# Patient Record
Sex: MALE | Race: BLACK OR AFRICAN AMERICAN | HISPANIC OR LATINO
[De-identification: names, ages, dates, MRNs, and addresses within clinical notes are randomized per-mention and may not be internally consistent; named-entity substitution may affect disease eponyms.]

---

## 2022-05-16 ENCOUNTER — APPOINTMENT (OUTPATIENT)
Dept: GASTROENTEROLOGY | Facility: CLINIC | Age: 67
End: 2022-05-16
Payer: MEDICARE

## 2022-05-16 VITALS
HEIGHT: 71 IN | HEART RATE: 82 BPM | DIASTOLIC BLOOD PRESSURE: 64 MMHG | SYSTOLIC BLOOD PRESSURE: 128 MMHG | OXYGEN SATURATION: 97 %

## 2022-05-16 DIAGNOSIS — Z12.11 ENCOUNTER FOR SCREENING FOR MALIGNANT NEOPLASM OF COLON: ICD-10-CM

## 2022-05-16 PROCEDURE — 99204 OFFICE O/P NEW MOD 45 MIN: CPT

## 2022-05-16 NOTE — ASSESSMENT
[FreeTextEntry1] : 66M, cardiomyopathy w/ reduced EF (35% in 2014), s/p ICD, s/p PCI x 2 on asa only (2006), HTN, HLD, referred for colon cancer screening by PCP Dr Solis Melendrez. Describes grade II hemorrhoids that prolapse and spontaneously reduce. No straining or sense of incomplete evacuation. Denies rectal pain, abdominal pain, hematochezia, melena, change in bowel movements, weight loss, n/v. Last colonoscopy ~10 yrs ago and normal per patient (no report available). No fhx of colon cancer. \par \par - Describes Grade II Hemorrhoids, +int/ext hemorrhoids on rectal exam in office. Recommend continuing topicals, prn miralax as needed, sitz baths \par - Refer to colorectal surgery as per patient request \par - Avg risk for colon cancer screening. Given extensive cardiac hx (PCI x 2, HFrEF, AICD), recommend obtaining cardiac clearance prior to procedure \par -  Follows w cardiologist Dr Don at [facility] [place], patient will make an appt and fax over clearance after his visit \par - RTC after cards evaluation \par

## 2022-05-16 NOTE — HISTORY OF PRESENT ILLNESS
[de-identified] : 66M, cardiomyopathy w/ reduced EF (35% in 2014), s/p ICD, s/p PCI x 2 (2006), HTN, HLD, referred for colon cancer screening by PCP Dr Solis Melendrez. Follows w cardiologist Dr Don at [facility]. \par \par Feels well overall. States that he has regular nonbloody brown bowel movements daily.  Describes grade II hemorrhoids that prolapse and spontaneously reduce. No straining or sense of incomplete evacuation. Denies rectal pain, abdominal pain, hematochezia, melena, change in bowel movements, weight loss, n/v. \par \par Last colonoscopy ~10 yrs ago and normal per patient (no report available). \par No fhx of colon cancer. \par \par \par

## 2022-05-16 NOTE — PHYSICAL EXAM
[General Appearance - Alert] : alert [General Appearance - In No Acute Distress] : in no acute distress [Sclera] : the sclera and conjunctiva were normal [Extraocular Movements] : extraocular movements were intact [] : no respiratory distress [Exaggerated Use Of Accessory Muscles For Inspiration] : no accessory muscle use [Abdomen Soft] : soft [Abdomen Tenderness] : non-tender [Internal Hemorrhoid] : internal hemorrhoids [External Hemorrhoid] : external hemorrhoids [Oriented To Time, Place, And Person] : oriented to person, place, and time [Impaired Insight] : insight and judgment were intact

## 2022-05-25 ENCOUNTER — APPOINTMENT (OUTPATIENT)
Dept: COLORECTAL SURGERY | Facility: CLINIC | Age: 67
End: 2022-05-25
Payer: MEDICARE

## 2022-05-25 VITALS
RESPIRATION RATE: 12 BRPM | BODY MASS INDEX: 24.08 KG/M2 | HEIGHT: 71 IN | HEART RATE: 77 BPM | TEMPERATURE: 97.7 F | DIASTOLIC BLOOD PRESSURE: 80 MMHG | WEIGHT: 172 LBS | OXYGEN SATURATION: 100 % | SYSTOLIC BLOOD PRESSURE: 119 MMHG

## 2022-05-25 DIAGNOSIS — E11.9 TYPE 2 DIABETES MELLITUS W/OUT COMPLICATIONS: ICD-10-CM

## 2022-05-25 PROCEDURE — 99204 OFFICE O/P NEW MOD 45 MIN: CPT | Mod: 25

## 2022-05-25 PROCEDURE — 46600 DIAGNOSTIC ANOSCOPY SPX: CPT

## 2022-05-25 RX ORDER — LOSARTAN POTASSIUM 25 MG/1
25 TABLET, FILM COATED ORAL
Refills: 0 | Status: ACTIVE | COMMUNITY

## 2022-05-25 RX ORDER — ATORVASTATIN CALCIUM 20 MG/1
20 TABLET, FILM COATED ORAL
Refills: 0 | Status: ACTIVE | COMMUNITY

## 2022-05-25 NOTE — HISTORY OF PRESENT ILLNESS
[FreeTextEntry1] : 67yo M pt presents with prolapsing perianal tissue with BM for 4-5yr, c/o burning pain, strained BM sometimes. It takes a while for the perianal tissue to reduce sometimes.\par Pt has been using prep H, helps temporarily. \par Last couple months symptoms have been worsening. Hx of hemorrhoidectomy under anesthesia in 2004, helped for about 10-12 yr and now he feels it is back.\par \par Pt has BM 5x a week, soft stools. Uses miralax 2x a week.\par Denies rectal bleeding.\par \par Last colonoscopy was 2008, nl study. Pt will schedule a colonoscopy with GI Dr. Cortez, waiting for cardiology clearance d/t hx of ICD, cardiac stentsx2, HTN, HLD, DM.

## 2022-05-25 NOTE — PHYSICAL EXAM
[Normal Breath Sounds] : Normal breath sounds [Normal Heart Sounds] : normal heart sounds [Normal Rate and Rhythm] : normal rate and rhythm [Alert] : alert [Oriented to Person] : oriented to person [Oriented to Place] : oriented to place [Oriented to Time] : oriented to time [Calm] : calm [Excoriation] : no perianal excoriation [Fistula] : no fistulas [Wart] : no warts [Ulcer ___ cm] : no ulcers [Normal] : was normal [None] : there was no rectal mass  [de-identified] : flat soft +BS NT/ND [de-identified] : mildly enlarged external hemorrhoids [de-identified] : anoscopy reveals large internal hemorrhoids [de-identified] : well nourished male [de-identified] : NC/AT [de-identified] : +ROM [de-identified] : intact

## 2022-05-25 NOTE — ASSESSMENT
[FreeTextEntry1] : We discussed the various options for hemorrhoids including topical tx, office procedures (namely RBL) and operative intervention (THD vs hemorrhoidectomy). R/B/A of each of the procedures were discussed including but not limited to pain, bleeding, and infection. \par \par He would like to proceed with THD\par He is seeing cardiology next week for clearance and then will schedule colonoscopy. Will book THD once colonoscopy is done

## 2022-06-26 ENCOUNTER — EMERGENCY (EMERGENCY)
Facility: HOSPITAL | Age: 67
LOS: 1 days | Discharge: HOME | End: 2022-06-26
Attending: EMERGENCY MEDICINE | Admitting: EMERGENCY MEDICINE
Payer: MEDICARE

## 2022-06-26 VITALS
DIASTOLIC BLOOD PRESSURE: 77 MMHG | SYSTOLIC BLOOD PRESSURE: 125 MMHG | RESPIRATION RATE: 16 BRPM | HEART RATE: 72 BPM | OXYGEN SATURATION: 100 %

## 2022-06-26 VITALS
TEMPERATURE: 97 F | HEART RATE: 84 BPM | DIASTOLIC BLOOD PRESSURE: 74 MMHG | RESPIRATION RATE: 16 BRPM | SYSTOLIC BLOOD PRESSURE: 113 MMHG | OXYGEN SATURATION: 100 %

## 2022-06-26 PROCEDURE — 73610 X-RAY EXAM OF ANKLE: CPT | Mod: 26,LT

## 2022-06-26 PROCEDURE — 99283 EMERGENCY DEPT VISIT LOW MDM: CPT

## 2022-06-26 RX ORDER — CEPHALEXIN 500 MG
500 CAPSULE ORAL ONCE
Refills: 0 | Status: COMPLETED | OUTPATIENT
Start: 2022-06-26 | End: 2022-06-26

## 2022-06-26 RX ORDER — CEPHALEXIN 500 MG
1 CAPSULE ORAL
Qty: 40 | Refills: 0
Start: 2022-06-26 | End: 2022-07-05

## 2022-06-26 RX ADMIN — Medication 500 MILLIGRAM(S): at 16:21

## 2022-06-26 NOTE — ED PROVIDER NOTE - NSFOLLOWUPINSTRUCTIONS_ED_ALL_ED_FT
Please follow up with your primary care physician within the next 4-5 days.    Medication has been sent to your pharmacy, please take as directed.    You may take Tylenol (500mg) every 6 hours or Ibuprofen (400mg) every 8 hours for pain control.     Cellulitis    WHAT YOU NEED TO KNOW:    Cellulitis is a skin infection caused by bacteria. Cellulitis may go away on its own or you may need treatment. Your healthcare provider may draw a [place] around the outside edges of your cellulitis. If your cellulitis spreads, your healthcare provider will see it outside of the [place].   Cellulitis      DISCHARGE INSTRUCTIONS:    Call 911 if:   •You have sudden trouble breathing or chest pain.  Seek care immediately if:   •Your wound gets larger and more painful.   •You feel a crackling under your skin when you touch it.  •You have purple dots or bumps on your skin, or you see bleeding under your skin.  •You have new swelling and pain in your legs.  •The red, warm, swollen area gets larger.  •You see red streaks coming from the infected area.  Contact your healthcare provider if:   •You have a fever.  •Your fever or pain does not go away or gets worse.  •The area does not get smaller after 2 days of antibiotics.  •Your skin is flaking or peeling off.  •You have questions or concerns about your condition or care.  Medicines:   •Antibiotics help treat the bacterial infection.   •NSAIDs, such as ibuprofen, help decrease swelling, pain, and fever. NSAIDs can cause stomach bleeding or kidney problems in certain people. If you take blood thinner medicine, always ask if NSAIDs are safe for you. Always read the medicine label and follow directions. Do not give these medicines to children under 6 months of age without direction from your child's healthcare provider.  •Acetaminophen decreases pain and fever. It is available without a doctor's order. Ask how much to take and how often to take it. Follow directions. Read the labels of all other medicines you are using to see if they also contain acetaminophen, or ask your doctor or pharmacist. Acetaminophen can cause liver damage if not taken correctly. Do not use more than 4 grams (4,000 milligrams) total of acetaminophen in one day.   •Take your medicine as directed. Contact your healthcare provider if you think your medicine is not helping or if you have side effects. Tell him or her if you are allergic to any medicine. Keep a list of the medicines, vitamins, and herbs you take. Include the amounts, and when and why you take them. Bring the list or the pill bottles to follow-up visits. Carry your medicine list with you in case of an emergency.  Self-care:   •Elevate the area above the level of your heart as often as you can. This will help decrease swelling and pain. Prop the area on pillows or blankets to keep it elevated comfortably.   •Clean the area daily until the wound scabs over. Gently wash the area with soap and water. Pat dry. Use dressings as directed.   •Place cool or warm, wet cloths on the area as directed. Use clean cloths and clean water. Leave it on the area until the cloth is room temperature. Pat the area dry with a clean, dry cloth. The cloths may help decrease pain.   Prevent cellulitis:   •Do not scratch bug bites or areas of injury. You increase your risk for cellulitis by scratching these areas.   •Do not share personal items, such as towels, clothing, and razors.   •Clean exercise equipment with germ-killing  before and after you use it.  •Wash your hands often. Use soap and water. Wash your hands after you use the bathroom, change a child's diapers, or sneeze. Wash your hands before you prepare or eat food. Use lotion to prevent dry, cracked skin.   Handwashing   •Wear pressure stockings as directed. You may be told to wear the stockings if you have peripheral edema. The stockings improve blood flow and decrease swelling.  •Treat athlete’s foot. This can help prevent the spread of a bacterial skin infection.  Follow up with your healthcare provider within 3 days, or as directed: Your healthcare provider will check if your cellulitis is getting better. You may need different medicine. Write down your questions so you remember to ask them during your visits.

## 2022-06-26 NOTE — ED PROVIDER NOTE - PHYSICAL EXAMINATION
gen: well appearing  Mentation: AAO x 3  psych: mood appropriate  HEENT: airway patent, conjunctivae clear bilaterally  Cardio: RRR, no m/r/g  Resp: normal BS b/l  GI: soft/nondistended/nontender  Neuro: sensation and motor function grossly intact  Skin: [id number] abrasion of the left anterior shin with surrounding erythema  MSK: normal movement of all extremities  Lymph/Vasc: no LE edema

## 2022-06-26 NOTE — ED PROVIDER NOTE - OBJECTIVE STATEMENT
67 y/o M w/ PMHx of MI (2006), HTN, hyperlipidemia, diabetes (on Losartan)  and PSHx of pacemaker placement presents to the ED c/o left ankle pain. Pt reports he fell onto ladder x4 days ago. Notes swelling and pain radiating from foot to calves. Denies fevers, LOC and head trauma. Pt did not see PCP for injury. Took Tylenol to relieve pain and only applied ice and warm water to injury. Ambulating with a limp. No allergies, smoking or drinking.

## 2022-06-26 NOTE — ED ADULT TRIAGE NOTE - CHIEF COMPLAINT QUOTE
pt fell off a ladder Wednesday now c/o left ankle swelling and pain and bruising to the left shin. pt ambulatory without difficulty. denies any LOC or head injury. denies any other complaints, pt in no distress. hx. DM2, defibrillator, MI 2006 with 2 stents

## 2022-06-26 NOTE — ED PROVIDER NOTE - PATIENT PORTAL LINK FT
You can access the FollowMyHealth Patient Portal offered by [facility] by registering at the following website: [email or web address]. By joining [facility]’s FollowMyHealth portal, you will also be able to view your health information using other applications (apps) compatible with our system.

## 2022-06-26 NOTE — ED PROVIDER NOTE - CLINICAL SUMMARY MEDICAL DECISION MAKING FREE TEXT BOX
rolling walker 66 yea old male with a PMHx of HTN, HLD, Diabetes presenting with left lower extremity pain and left ankle pain after a fall. Concerned for early cellulitic infection. Patient denies fevers, weakness, numbness in MELISSA. Xray, abx.

## 2022-06-26 NOTE — ED PROVIDER NOTE - ATTENDING CONTRIBUTION TO CARE
66 year old with abrasion to lle. xray for fracture or gas. likely simple cellulitis.  no bullae on exam

## 2022-07-01 PROBLEM — E11.9 TYPE 2 DIABETES MELLITUS WITHOUT COMPLICATIONS: Chronic | Status: ACTIVE | Noted: 2022-06-26

## 2022-07-01 PROBLEM — I10 ESSENTIAL (PRIMARY) HYPERTENSION: Chronic | Status: ACTIVE | Noted: 2022-06-26

## 2022-07-01 PROBLEM — I21.9 ACUTE MYOCARDIAL INFARCTION, UNSPECIFIED: Chronic | Status: ACTIVE | Noted: 2022-06-26

## 2022-07-01 PROBLEM — E78.5 HYPERLIPIDEMIA, UNSPECIFIED: Chronic | Status: ACTIVE | Noted: 2022-06-26

## 2022-07-05 ENCOUNTER — EMERGENCY (EMERGENCY)
Facility: HOSPITAL | Age: 67
LOS: 1 days | Discharge: HOME | End: 2022-07-05
Attending: EMERGENCY MEDICINE | Admitting: EMERGENCY MEDICINE

## 2022-07-05 VITALS
OXYGEN SATURATION: 100 % | SYSTOLIC BLOOD PRESSURE: 123 MMHG | TEMPERATURE: 98 F | DIASTOLIC BLOOD PRESSURE: 75 MMHG | RESPIRATION RATE: 20 BRPM | HEART RATE: 51 BPM

## 2022-07-05 DIAGNOSIS — Z95.0 PRESENCE OF CARDIAC PACEMAKER: Chronic | ICD-10-CM

## 2022-07-05 PROCEDURE — 99284 EMERGENCY DEPT VISIT MOD MDM: CPT | Mod: 25

## 2022-07-05 PROCEDURE — 76881 US COMPL JOINT R-T W/IMG: CPT | Mod: 26,LT,59

## 2022-07-05 RX ORDER — ACETAMINOPHEN 500 MG
650 TABLET ORAL ONCE
Refills: 0 | Status: COMPLETED | OUTPATIENT
Start: 2022-07-05 | End: 2022-07-05

## 2022-07-05 RX ADMIN — Medication 650 MILLIGRAM(S): at 19:53

## 2022-07-05 NOTE — ED PROVIDER NOTE - NS ED ROS FT
Constitutional:  See HPI, no fevers  Cardiac:  No chest pain  Respiratory:  No cough or respiratory distress.   GI:  No nausea, vomiting, diarrhea or abdominal pain.  SKIN/MS:  LLE area of swelling with some surrounding swelling  Neuro: no numbness/tingling  Except as documented in the HPI,  all other systems are negative

## 2022-07-05 NOTE — ED PROVIDER NOTE - CARE PLAN
1 Principal Discharge DX:	Injury of lower leg   Principal Discharge DX:	Injury of lower leg  Secondary Diagnosis:	Traumatic seroma of left lower leg, sequela

## 2022-07-05 NOTE — ED PROVIDER NOTE - ATTENDING CONTRIBUTION TO CARE
66M h/o DM MI PPM, 22nd of Jun, fell off a ladder, scraped his leg, banged it, it swelled and then got red, came here Jun 26, had xray no injury, was given keflex, was seen by PMD and sent in here c/f abscess.  Also reports swelling of foot.  no fever.  Not on AC - just ASA.  No warmth, mild tenderness to palpable fluctuance area about 8 cm x 4 cm.  US shows large fluid collection pretibial area L shin without surrounding hyperemia.  Likely seroma.  Needle aspiration reveals dark blood.  Not an abscess.  No warmth to shin and rest of foot, mild edema.  Likely edema from reabsorbing seroma.   No role for further drainage or further abx.  Rx tylenol, ACE wrap, elevation, d/c home.  No warmth to suggest infection.  OK for d/c home f/u PMD.  VS:  unremarkable    GEN - NAD;   well appearing;   A+O x3   HEAD - NC/AT     ENT - PEERL, EOMI, mucous membranes    moist , no discharge      NECK: Neck supple, non-tender without lymphadenopathy, no masses, no JVD  PULM - CTA b/l,  symmetric breath sounds  COR -  normal heart sounds    ABD - , ND, NT, soft,  BACK - no CVA tenderness, nontender spine     EXTREMS - no edema, no deformity, warm and well perfused  except L shin - No warmth, mild tenderness to palpable fluctuance area about 8 cm x 4 cm.  Mild edema of L foot, not warm.  Distal foot NVT intact.  No bony ttp or deformity.  SKIN - no rash    or bruising      NEUROLOGIC - alert, face symmetric, speech fluent, sensation nl, motor no focal deficit.

## 2022-07-05 NOTE — ED PROVIDER NOTE - CLINICAL SUMMARY MEDICAL DECISION MAKING FREE TEXT BOX
Chance PGY2: 67yo M with htn hld dm presents for re-eval of leg wound sustained ~ 13d prior with persistent swelling w/o worsening while on keflex. No inc redness, no fevers, no streaking. Persistent swelling with minimal warmth , no purulence on needle aspiration, no US suggestive of abscess. Likely persistent hematoma. ACE wrap, pressure, tylenol and instructions and plan to dc home.

## 2022-07-05 NOTE — ED PROVIDER NOTE - PHYSICAL EXAMINATION
CONSTITUTIONAL: NAD  HEAD: NCAT  EYES: NL inspection  ENT: MMM  NECK: Supple; non tender.  CARD: RRR  RESP: CTAB  ABD: S/NT no R/G  SKIN/EXT: oval 4gft3dh area of swelling, TTP, no significant warmth with some surrounding erythema w/o streaking, edema of foot below, soft to pressure; intact DP pulse b/l   NEURO: Grossly unremarkable  PSYCH: Cooperative, appropriate.

## 2022-07-05 NOTE — ED PROVIDER NOTE - NSICDXPASTMEDICALHX_GEN_ALL_CORE_FT
PAST MEDICAL HISTORY:  Acute myocardial infarction     DM (diabetes mellitus)     HLD (hyperlipidemia)     HTN (hypertension)

## 2022-07-05 NOTE — ED PROVIDER NOTE - NSFOLLOWUPINSTRUCTIONS_ED_ALL_ED_FT
Hematoma of Lower Leg    Likely collection of blood on leg related to prior injury, no clear signs of worsening infection.  Ultrasound and needle aspiration did not reveal signs of abscess/pus.   Blood collections should slowly drain and be reabsorbed. You may develop bruising down below the area.     We recommend the following:   - Tylenol 1000mg every 6-8 hours as needed for pain  - Keep leg wrapped with pressure bandage while standing or if leg horizontal  - While sitting, keep leg elevated  - Can apply warm compresses  - Keep area clean and dry.     If your pain worsens, develop increasing heat or redness, fevers, purulence comes from wound - seek immediate medical attention     Rest, drink plenty of fluids.  Advance activity as tolerated.  Continue all previously prescribed medications as directed.  Follow up with your primary care physician in 48-72 hours- bring copies of your results.  Return to the ER for worsening or persistent symptoms, and/or ANY NEW OR CONCERNING SYMPTOMS. If you have issues obtaining follow up, please call: [phone] to obtain a doctor or specialist who takes your insurance in your area.

## 2022-07-05 NOTE — ED PROVIDER NOTE - OBJECTIVE STATEMENT
67yo M with PMH of MI, HTN, HLD, DM2, s/p PPM presents for eval of LLE swelling. On 6/22 fell off ladder and hit L leg, developed swelling there but no significant pain. Became a little more tender and red on 6/26 so went to ED and was given Keflex after xray/labs. WEnt to PCP who did duplex which was neg. Pt still taking keflex, went to PCP who sent him for re-eval to ED as still swollen and tender with some surrounding edema. No fever, CP, SOB, abd pain, NVDC. No other injuries.

## 2022-07-05 NOTE — ED PROVIDER NOTE - PROGRESS NOTE DETAILS
Chance PGY2: needle aspiration did not yield fluid, only scant old dark blood came from puncture site. NO pus. US visualized needle in center of fluid collection. US did not show elevated vascularity of area - likely not abscess, more likely hematoma. ACE compression wrap placed

## 2022-07-11 ENCOUNTER — OUTPATIENT (OUTPATIENT)
Dept: OUTPATIENT SERVICES | Facility: HOSPITAL | Age: 67
LOS: 1 days | End: 2022-07-11
Payer: COMMERCIAL

## 2022-07-11 VITALS
RESPIRATION RATE: 14 BRPM | SYSTOLIC BLOOD PRESSURE: 105 MMHG | WEIGHT: 170.42 LBS | TEMPERATURE: 98 F | HEIGHT: 71 IN | DIASTOLIC BLOOD PRESSURE: 68 MMHG | OXYGEN SATURATION: 97 %

## 2022-07-11 DIAGNOSIS — Z95.5 PRESENCE OF CORONARY ANGIOPLASTY IMPLANT AND GRAFT: Chronic | ICD-10-CM

## 2022-07-11 DIAGNOSIS — Z95.810 PRESENCE OF AUTOMATIC (IMPLANTABLE) CARDIAC DEFIBRILLATOR: ICD-10-CM

## 2022-07-11 DIAGNOSIS — Z11.52 ENCOUNTER FOR SCREENING FOR COVID-19: ICD-10-CM

## 2022-07-11 DIAGNOSIS — Z95.810 PRESENCE OF AUTOMATIC (IMPLANTABLE) CARDIAC DEFIBRILLATOR: Chronic | ICD-10-CM

## 2022-07-11 DIAGNOSIS — Z95.5 PRESENCE OF CORONARY ANGIOPLASTY IMPLANT AND GRAFT: ICD-10-CM

## 2022-07-11 DIAGNOSIS — Z95.0 PRESENCE OF CARDIAC PACEMAKER: Chronic | ICD-10-CM

## 2022-07-11 DIAGNOSIS — Z12.11 ENCOUNTER FOR SCREENING FOR MALIGNANT NEOPLASM OF COLON: ICD-10-CM

## 2022-07-11 DIAGNOSIS — E11.9 TYPE 2 DIABETES MELLITUS WITHOUT COMPLICATIONS: ICD-10-CM

## 2022-07-11 DIAGNOSIS — Z98.890 OTHER SPECIFIED POSTPROCEDURAL STATES: Chronic | ICD-10-CM

## 2022-07-11 DIAGNOSIS — Z01.818 ENCOUNTER FOR OTHER PREPROCEDURAL EXAMINATION: ICD-10-CM

## 2022-07-11 DIAGNOSIS — Z91.89 OTHER SPECIFIED PERSONAL RISK FACTORS, NOT ELSEWHERE CLASSIFIED: ICD-10-CM

## 2022-07-11 PROBLEM — I10 ESSENTIAL (PRIMARY) HYPERTENSION: Chronic | Status: ACTIVE | Noted: 2022-07-05

## 2022-07-11 PROBLEM — E78.5 HYPERLIPIDEMIA, UNSPECIFIED: Chronic | Status: ACTIVE | Noted: 2022-07-05

## 2022-07-11 LAB
A1C WITH ESTIMATED AVERAGE GLUCOSE RESULT: 7.6 % — HIGH (ref 4–5.6)
ANION GAP SERPL CALC-SCNC: 14 MMOL/L — SIGNIFICANT CHANGE UP (ref 5–17)
BUN SERPL-MCNC: 18 MG/DL — SIGNIFICANT CHANGE UP (ref 7–23)
CALCIUM SERPL-MCNC: 9.8 MG/DL — SIGNIFICANT CHANGE UP (ref 8.4–10.5)
CHLORIDE SERPL-SCNC: 103 MMOL/L — SIGNIFICANT CHANGE UP (ref 96–108)
CO2 SERPL-SCNC: 22 MMOL/L — SIGNIFICANT CHANGE UP (ref 22–31)
CREAT SERPL-MCNC: 0.85 MG/DL — SIGNIFICANT CHANGE UP (ref 0.5–1.3)
EGFR: 95 ML/MIN/1.73M2 — SIGNIFICANT CHANGE UP
ESTIMATED AVERAGE GLUCOSE: 171 MG/DL — HIGH (ref 68–114)
GLUCOSE SERPL-MCNC: 107 MG/DL — HIGH (ref 70–99)
HCT VFR BLD CALC: 43.7 % — SIGNIFICANT CHANGE UP (ref 39–50)
HGB BLD-MCNC: 15 G/DL — SIGNIFICANT CHANGE UP (ref 13–17)
MCHC RBC-ENTMCNC: 28.9 PG — SIGNIFICANT CHANGE UP (ref 27–34)
MCHC RBC-ENTMCNC: 34.3 GM/DL — SIGNIFICANT CHANGE UP (ref 32–36)
MCV RBC AUTO: 84.2 FL — SIGNIFICANT CHANGE UP (ref 80–100)
NRBC # BLD: 0 /100 WBCS — SIGNIFICANT CHANGE UP (ref 0–0)
PLATELET # BLD AUTO: 226 K/UL — SIGNIFICANT CHANGE UP (ref 150–400)
POTASSIUM SERPL-MCNC: 4.1 MMOL/L — SIGNIFICANT CHANGE UP (ref 3.5–5.3)
POTASSIUM SERPL-SCNC: 4.1 MMOL/L — SIGNIFICANT CHANGE UP (ref 3.5–5.3)
RBC # BLD: 5.19 M/UL — SIGNIFICANT CHANGE UP (ref 4.2–5.8)
RBC # FLD: 13.3 % — SIGNIFICANT CHANGE UP (ref 10.3–14.5)
SARS-COV-2 RNA SPEC QL NAA+PROBE: SIGNIFICANT CHANGE UP
SODIUM SERPL-SCNC: 139 MMOL/L — SIGNIFICANT CHANGE UP (ref 135–145)
WBC # BLD: 7.43 K/UL — SIGNIFICANT CHANGE UP (ref 3.8–10.5)
WBC # FLD AUTO: 7.43 K/UL — SIGNIFICANT CHANGE UP (ref 3.8–10.5)

## 2022-07-11 PROCEDURE — C9803: CPT

## 2022-07-11 PROCEDURE — U0003: CPT

## 2022-07-11 PROCEDURE — U0005: CPT

## 2022-07-11 PROCEDURE — 85027 COMPLETE CBC AUTOMATED: CPT

## 2022-07-11 PROCEDURE — G0463: CPT

## 2022-07-11 PROCEDURE — 80048 BASIC METABOLIC PNL TOTAL CA: CPT

## 2022-07-11 PROCEDURE — 83036 HEMOGLOBIN GLYCOSYLATED A1C: CPT

## 2022-07-11 RX ORDER — ATORVASTATIN CALCIUM 80 MG/1
0 TABLET, FILM COATED ORAL
Qty: 0 | Refills: 0 | DISCHARGE

## 2022-07-11 NOTE — H&P PST ADULT - HISTORY OF PRESENT ILLNESS
67 yo male with h/o CAD (PCI x 2 12/2006), HFrEF s/p AICD placement 2007 (generator change 2014), HTN, HLD Type 2 DM, BPH and Hemorrhoids is scheduled for Colonoscopy on 7/15/22.    Pt Has been vaccinated twice and boosted twice against Covid-19. He states he has never gotten Covid. He is scheduled for Covid-19 PCR on 7/11/22 at [facility].

## 2022-07-11 NOTE — H&P PST ADULT - PROBLEM SELECTOR PLAN 4
Call placed to Dr Gottlieb's office requesting copy of recent interrogation. Office staff stated they will fax to [facility] - awaiting report.    Endoscopy Booking notified

## 2022-07-11 NOTE — H&P PST ADULT - NSICDXPASTSURGICALHX_GEN_ALL_CORE_FT
PAST SURGICAL HISTORY:  H/O colonoscopy 14 years ago    History of automatic internal cardiac defibrillator (AICD)     Stented coronary artery x 2 - 12/2006     PAST SURGICAL HISTORY:  H/O colonoscopy 14 years ago    History of automatic internal cardiac defibrillator (AICD) Medtronic [id number] - placed 2007, generator change 2014 - per pt, interrogtaed within last 6 months    Stented coronary artery x 2 - 12/2006

## 2022-07-11 NOTE — H&P PST ADULT - SKIN/BREAST COMMENTS
abrasion to left LE below knee after falling from ladder 6/28/22 - injury became swollen and reddened - was seen in ED - antibiotics x 10 days pres abrasion to left LE below knee after falling from ladder 6/28/22 - injury became swollen and reddened - was seen in ED - Cephalexin5 x 10 days pres

## 2022-07-11 NOTE — H&P PST ADULT - PROBLEM SELECTOR PLAN 2
Pt no longer taking Jardiance  Pt instructed to hold metformin 24 hours preop  Pt instructed to check FS on am of surgery  Stat FS on admission

## 2022-07-11 NOTE — H&P PST ADULT - NSICDXPASTMEDICALHX_GEN_ALL_CORE_FT
PAST MEDICAL HISTORY:  Acute myocardial infarction     DM (diabetes mellitus) Type 2 DM    H/O hemorrhoids     HLD (hyperlipidemia)     HTN (hypertension)      PAST MEDICAL HISTORY:  Acute myocardial infarction 2006    Chronic HFrEF (heart failure with reduced ejection fraction) LVEF 40% TTE 4/19/22    DM (diabetes mellitus) Type 2 DM - states he is no longer taking Jardiance as it is not covered by insurance    H/O hemorrhoids     History of BPH     HLD (hyperlipidemia)     HTN (hypertension)

## 2022-07-11 NOTE — H&P PST ADULT - PROBLEM SELECTOR PLAN 3
Per Cardiology eval, pt may hold aspirin 5-7 days preop - last dose taken 7/10/22 pm - pt will hold to OR.

## 2022-07-11 NOTE — H&P PST ADULT - FALL HARM RISK - RISK INTERVENTIONS

## 2022-07-11 NOTE — H&P PST ADULT - RESPIRATORY
clear to auscultation bilaterally/no wheezes/breath sounds equal/good air movement/respirations non-labored

## 2022-07-11 NOTE — H&P PST ADULT - ASSESSMENT
Routine Screening for Malignant Neoplasm of Colon  Type 2 DM  Stented Coronary Artery  AICD  At Risk for Sleep Apnea

## 2022-07-15 ENCOUNTER — RESULT REVIEW (OUTPATIENT)
Age: 67
End: 2022-07-15

## 2022-07-15 ENCOUNTER — OUTPATIENT (OUTPATIENT)
Dept: OUTPATIENT SERVICES | Facility: HOSPITAL | Age: 67
LOS: 1 days | End: 2022-07-15
Payer: COMMERCIAL

## 2022-07-15 ENCOUNTER — APPOINTMENT (OUTPATIENT)
Dept: GASTROENTEROLOGY | Facility: HOSPITAL | Age: 67
End: 2022-07-15

## 2022-07-15 ENCOUNTER — TRANSCRIPTION ENCOUNTER (OUTPATIENT)
Age: 67
End: 2022-07-15

## 2022-07-15 VITALS
HEART RATE: 69 BPM | RESPIRATION RATE: 14 BRPM | DIASTOLIC BLOOD PRESSURE: 76 MMHG | SYSTOLIC BLOOD PRESSURE: 111 MMHG | OXYGEN SATURATION: 98 %

## 2022-07-15 VITALS
HEART RATE: 76 BPM | RESPIRATION RATE: 20 BRPM | WEIGHT: 171.96 LBS | SYSTOLIC BLOOD PRESSURE: 123 MMHG | DIASTOLIC BLOOD PRESSURE: 81 MMHG | TEMPERATURE: 98 F | OXYGEN SATURATION: 97 % | HEIGHT: 71 IN

## 2022-07-15 DIAGNOSIS — Z95.5 PRESENCE OF CORONARY ANGIOPLASTY IMPLANT AND GRAFT: Chronic | ICD-10-CM

## 2022-07-15 DIAGNOSIS — Z01.818 ENCOUNTER FOR OTHER PREPROCEDURAL EXAMINATION: ICD-10-CM

## 2022-07-15 DIAGNOSIS — Z95.810 PRESENCE OF AUTOMATIC (IMPLANTABLE) CARDIAC DEFIBRILLATOR: Chronic | ICD-10-CM

## 2022-07-15 DIAGNOSIS — Z12.11 ENCOUNTER FOR SCREENING FOR MALIGNANT NEOPLASM OF COLON: ICD-10-CM

## 2022-07-15 DIAGNOSIS — Z98.890 OTHER SPECIFIED POSTPROCEDURAL STATES: Chronic | ICD-10-CM

## 2022-07-15 LAB — GLUCOSE BLDC GLUCOMTR-MCNC: 120 MG/DL — HIGH (ref 70–99)

## 2022-07-15 PROCEDURE — 88305 TISSUE EXAM BY PATHOLOGIST: CPT

## 2022-07-15 PROCEDURE — 45380 COLONOSCOPY AND BIOPSY: CPT | Mod: 33

## 2022-07-15 PROCEDURE — 88305 TISSUE EXAM BY PATHOLOGIST: CPT | Mod: 26

## 2022-07-15 PROCEDURE — 45380 COLONOSCOPY AND BIOPSY: CPT

## 2022-07-15 PROCEDURE — 82962 GLUCOSE BLOOD TEST: CPT

## 2022-07-15 DEVICE — NET RETRV ROT ROTH 2.5MMX230CM: Type: IMPLANTABLE DEVICE | Status: FUNCTIONAL

## 2022-07-15 RX ORDER — TAMSULOSIN HYDROCHLORIDE 0.4 MG/1
1 CAPSULE ORAL
Qty: 0 | Refills: 0 | DISCHARGE

## 2022-07-15 RX ORDER — SODIUM CHLORIDE 9 MG/ML
500 INJECTION INTRAMUSCULAR; INTRAVENOUS; SUBCUTANEOUS
Refills: 0 | Status: DISCONTINUED | OUTPATIENT
Start: 2022-07-15 | End: 2022-07-29

## 2022-07-15 NOTE — ASU DISCHARGE PLAN (ADULT/PEDIATRIC) - NS MD DC FALL RISK RISK
For information on Fall & Injury Prevention, visit: [email or web address] OR  [email or web address] OR  https://www.cdc.gov/steadi/patient.html

## 2022-07-15 NOTE — ASU PATIENT PROFILE, ADULT - FALL HARM RISK - RISK INTERVENTIONS

## 2022-07-15 NOTE — PRE PROCEDURE NOTE - PRE PROCEDURE EVALUATION
Attending Physician:           True Cortez                  Procedure: Colonoscopy     Indication for Procedure: Screening Colonoscopy   ________________________________________________________  PAST MEDICAL & SURGICAL HISTORY:  HTN (hypertension)      HLD (hyperlipidemia)      DM (diabetes mellitus)  Type 2 DM - states he is no longer taking Jardiance as it is not covered by insurance      Acute myocardial infarction  2006      H/O hemorrhoids      Chronic HFrEF (heart failure with reduced ejection fraction)  LVEF 40% TTE 4/19/22      History of BPH      Stented coronary artery  x 2 - 12/2006      History of automatic internal cardiac defibrillator (AICD)  Medtronic [id number] - placed 2007, generator change 2014 - per pt, interrogtaed within last 6 months      H/O colonoscopy  14 years ago        ALLERGIES:  No Known Allergies    HOME MEDICATIONS:  atorvastatin 20 mg oral tablet: 1 tab(s) orally once a day  carvedilol 3.125 mg oral tablet: 1 tab(s) orally 2 times a day  losartan 25 mg oral tablet: 1 tab(s) orally once a day  metFORMIN 850 mg oral tablet: 1 tab(s) orally once a day (in the evening) - hold 24 hours preop  tamsulosin 0.4 mg oral capsule: 1 cap(s) orally once a day  tamsulosin 0.4 mg oral capsule: 1 cap(s) orally once a day    AICD/PPM: [ x] yes   [ ] no    PERTINENT LAB DATA:                      PHYSICAL EXAMINATION:    T(C): --  HR: --  BP: --  RR: --  SpO2: --    Constitutional: NAD  HEENT: PERRLA, EOMI,    Neck:  No JVD  Respiratory: CTAB/L  Cardiovascular: S1 and S2  Gastrointestinal: BS+, soft, NT/ND  Extremities: No peripheral edema  Neurological: A/O x 3, no focal deficits  Psychiatric: Normal mood, normal affect  Skin: No rashes    ASA Class: I [ ]  II [ ]  III [x ]  IV [ ]    COMMENTS:    The patient is a suitable candidate for the planned procedure unless box checked [ ]  No, explain:

## 2022-07-15 NOTE — ASU PATIENT PROFILE, ADULT - NSICDXPASTMEDICALHX_GEN_ALL_CORE_FT
PAST MEDICAL HISTORY:  Acute myocardial infarction 2006    Chronic HFrEF (heart failure with reduced ejection fraction) LVEF 40% TTE 4/19/22    DM (diabetes mellitus) Type 2 DM - states he is no longer taking Jardiance as it is not covered by insurance    H/O hemorrhoids     History of BPH     HLD (hyperlipidemia)     HTN (hypertension)

## 2022-07-18 LAB — SURGICAL PATHOLOGY STUDY: SIGNIFICANT CHANGE UP

## 2022-07-20 ENCOUNTER — NON-APPOINTMENT (OUTPATIENT)
Age: 67
End: 2022-07-20

## 2022-07-27 NOTE — H&P PST ADULT - RESPIRATORY RATE (BREATHS/MIN)
Subjective:       Patient ID: Nelida Nicholas is a 67 y.o. female.    Chief Complaint: Follow-up, Medication Refill, and Osteoporosis    Wants ears checked.  Was given silvadene cream for vagina/perineum by Dr. Guadarrama a couple years ago, it helped.  Has been using it on lesions in gluteal cleft, requests refill.  Prolia shot due today.    Review of Systems   Constitutional: Negative for appetite change, chills, fatigue, fever and unexpected weight change.   Respiratory: Negative for cough and shortness of breath.    Cardiovascular: Negative for chest pain and leg swelling.   Gastrointestinal: Negative for abdominal pain.   Musculoskeletal: Positive for arthralgias.   Integumentary:  Positive for rash and mole/lesion.   Neurological: Positive for headaches. Negative for weakness.   Psychiatric/Behavioral: The patient is not nervous/anxious.          Objective:      Physical Exam  Constitutional:       General: She is not in acute distress.     Appearance: Normal appearance.   HENT:      Right Ear: Tympanic membrane, ear canal and external ear normal.      Left Ear: Tympanic membrane, ear canal and external ear normal.   Cardiovascular:      Rate and Rhythm: Normal rate and regular rhythm.      Heart sounds: Normal heart sounds.   Pulmonary:      Breath sounds: Normal breath sounds.   Abdominal:      General: Abdomen is flat.      Palpations: Abdomen is soft.   Skin:     General: Skin is warm and dry.      Findings: Lesion and rash present.      Comments: Has erythematous scaly lesions in gluteal cleft that appear more fungal   Neurological:      Mental Status: She is alert. Mental status is at baseline.   Psychiatric:         Mood and Affect: Mood normal.         Behavior: Behavior normal.         Thought Content: Thought content normal.         Judgment: Judgment normal.         Assessment:       1. Chronic pain syndrome     2. Encounter for long-term (current) use of other medications  Comprehensive Metabolic Panel     Hemoglobin A1C    POCT Urine Drug Screen Presump    Comprehensive Metabolic Panel    CANCELED: Hemoglobin A1C   3. Nutritional anemia  Ferritin    Iron and TIBC    CBC Auto Differential    Vitamin B12 & Folate    Ferritin    Iron and TIBC    Vitamin B12 & Folate    CANCELED: CBC Auto Differential   4. Elevated levels of transaminase & lactic acid dehydrogenase  Comprehensive Metabolic Panel    CBC Auto Differential    Comprehensive Metabolic Panel    CANCELED: CBC Auto Differential   5. Type 1 diabetes mellitus with hypoglycemia and without coma  Comprehensive Metabolic Panel    Hemoglobin A1C    Vitamin B12 & Folate    Comprehensive Metabolic Panel    Vitamin B12 & Folate    CANCELED: Hemoglobin A1C   6. Hypothyroidism, unspecified type  CBC Auto Differential    T4, Free    TSH    T4, Free    TSH    CANCELED: CBC Auto Differential   7. Osteoporosis, unspecified osteoporosis type, unspecified pathological fracture presence  Comprehensive Metabolic Panel    Comprehensive Metabolic Panel    denosumab (PROLIA) injection 60 mg       Plan:       Gabapentin 100mg refill  welchol refill  Antifungal cream trial for buttock lesions first.  Refilled silvadene cream to have on hand.  prolia shot today   and UDS reviewed, ok for refills.  She needs to check sugar 8 times a day due to insulin pump and having labile sugars.   14

## 2022-08-08 PROBLEM — E11.9 TYPE 2 DIABETES MELLITUS WITHOUT COMPLICATIONS: Chronic | Status: ACTIVE | Noted: 2022-07-05

## 2022-08-08 PROBLEM — I21.9 ACUTE MYOCARDIAL INFARCTION, UNSPECIFIED: Chronic | Status: ACTIVE | Noted: 2022-07-05

## 2022-08-08 PROBLEM — I50.22 CHRONIC SYSTOLIC (CONGESTIVE) HEART FAILURE: Chronic | Status: ACTIVE | Noted: 2022-07-11

## 2022-08-08 PROBLEM — Z87.19 PERSONAL HISTORY OF OTHER DISEASES OF THE DIGESTIVE SYSTEM: Chronic | Status: ACTIVE | Noted: 2022-07-11

## 2022-08-08 PROBLEM — Z87.438 PERSONAL HISTORY OF OTHER DISEASES OF MALE GENITAL ORGANS: Chronic | Status: ACTIVE | Noted: 2022-07-11

## 2022-08-26 ENCOUNTER — APPOINTMENT (OUTPATIENT)
Dept: COLORECTAL SURGERY | Facility: CLINIC | Age: 67
End: 2022-08-26

## 2022-08-26 PROCEDURE — 99213 OFFICE O/P EST LOW 20 MIN: CPT

## 2022-08-26 NOTE — ASSESSMENT
[FreeTextEntry1] : We discussed the options for hemorrhoids including topical treatment, office procedures (namely rubber band ligation), and operative intervention (THD vs hemorrhoidectomy). The r/b/a of the procedures were discussed including but not limited to pain, bleeding, and infection.\par \par As discussed on his previous exam we agreed to proceed with THD as his complaints are mostly from his internal hemorrhoids\par

## 2022-08-26 NOTE — PHYSICAL EXAM
[Normal Breath Sounds] : Normal breath sounds [Normal Heart Sounds] : normal heart sounds [Normal Rate and Rhythm] : normal rate and rhythm [Alert] : alert [Oriented to Person] : oriented to person [Oriented to Place] : oriented to place [Oriented to Time] : oriented to time [Calm] : calm [de-identified] : flat soft +BS NT/ND [de-identified] : well nourished male [de-identified] : NC/AT [de-identified] : +ROM [de-identified] : intact

## 2022-08-26 NOTE — HISTORY OF PRESENT ILLNESS
[FreeTextEntry1] : The patient had a colonoscopy where he was found to have a single tubular adenoma.  Otherwise unremarkable.  He wishes to now continue on with surgical treatment for his hemorrhoids

## 2022-09-12 ENCOUNTER — APPOINTMENT (OUTPATIENT)
Dept: COLORECTAL SURGERY | Facility: HOSPITAL | Age: 67
End: 2022-09-12

## 2023-04-24 ENCOUNTER — APPOINTMENT (OUTPATIENT)
Dept: GASTROENTEROLOGY | Facility: CLINIC | Age: 68
End: 2023-04-24

## 2023-08-10 ENCOUNTER — NON-APPOINTMENT (OUTPATIENT)
Age: 68
End: 2023-08-10

## 2023-08-10 ENCOUNTER — APPOINTMENT (OUTPATIENT)
Dept: ELECTROPHYSIOLOGY | Facility: CLINIC | Age: 68
End: 2023-08-10
Payer: MEDICARE

## 2023-08-10 PROCEDURE — 93283 PRGRMG EVAL IMPLANTABLE DFB: CPT

## 2023-08-10 RX ORDER — CARVEDILOL 3.12 MG/1
3.12 TABLET, FILM COATED ORAL TWICE DAILY
Refills: 0 | Status: ACTIVE | COMMUNITY

## 2023-08-10 RX ORDER — POLYETHYLENE GLYCOL 3350 AND ELECTROLYTES WITH LEMON FLAVOR 236; 22.74; 6.74; 5.86; 2.97 G/4L; G/4L; G/4L; G/4L; G/4L
236 POWDER, FOR SOLUTION ORAL
Qty: 1 | Refills: 0 | Status: DISCONTINUED | COMMUNITY
Start: 2022-06-07 | End: 2023-08-10

## 2023-08-10 RX ORDER — ISOSORBIDE MONONITRATE 30 MG/1
30 TABLET, EXTENDED RELEASE ORAL
Refills: 0 | Status: ACTIVE | COMMUNITY

## 2023-08-25 ENCOUNTER — APPOINTMENT (OUTPATIENT)
Dept: ELECTROPHYSIOLOGY | Facility: CLINIC | Age: 68
End: 2023-08-25

## 2023-11-07 ENCOUNTER — NON-APPOINTMENT (OUTPATIENT)
Age: 68
End: 2023-11-07

## 2023-11-07 ENCOUNTER — APPOINTMENT (OUTPATIENT)
Dept: ELECTROPHYSIOLOGY | Facility: CLINIC | Age: 68
End: 2023-11-07
Payer: MEDICARE

## 2023-11-07 VITALS
TEMPERATURE: 98.2 F | HEART RATE: 94 BPM | SYSTOLIC BLOOD PRESSURE: 109 MMHG | WEIGHT: 174 LBS | BODY MASS INDEX: 24.36 KG/M2 | OXYGEN SATURATION: 97 % | HEIGHT: 71 IN | DIASTOLIC BLOOD PRESSURE: 75 MMHG

## 2023-11-07 DIAGNOSIS — I50.9 HEART FAILURE, UNSPECIFIED: ICD-10-CM

## 2023-11-07 DIAGNOSIS — I10 ESSENTIAL (PRIMARY) HYPERTENSION: ICD-10-CM

## 2023-11-07 DIAGNOSIS — I42.9 CARDIOMYOPATHY, UNSPECIFIED: ICD-10-CM

## 2023-11-07 DIAGNOSIS — E78.5 HYPERLIPIDEMIA, UNSPECIFIED: ICD-10-CM

## 2023-11-07 PROCEDURE — 99213 OFFICE O/P EST LOW 20 MIN: CPT | Mod: 25

## 2023-11-07 PROCEDURE — 93000 ELECTROCARDIOGRAM COMPLETE: CPT

## 2023-11-07 RX ORDER — METFORMIN HYDROCHLORIDE 1000 MG/1
1000 TABLET, COATED ORAL
Qty: 180 | Refills: 2 | Status: ACTIVE | COMMUNITY

## 2023-11-07 RX ORDER — MELOXICAM 15 MG/1
15 TABLET ORAL
Refills: 0 | Status: DISCONTINUED | COMMUNITY
End: 2023-11-07

## 2023-11-07 RX ORDER — EMPAGLIFLOZIN 10 MG/1
10 TABLET, FILM COATED ORAL
Refills: 0 | Status: DISCONTINUED | COMMUNITY
End: 2023-11-07

## 2023-11-08 LAB
ALBUMIN SERPL ELPH-MCNC: 5 G/DL
ALP BLD-CCNC: 63 U/L
ALT SERPL-CCNC: 17 U/L
ANION GAP SERPL CALC-SCNC: 18 MMOL/L
AST SERPL-CCNC: 16 U/L
BILIRUB SERPL-MCNC: 0.3 MG/DL
BUN SERPL-MCNC: 18 MG/DL
CALCIUM SERPL-MCNC: 10.2 MG/DL
CHLORIDE SERPL-SCNC: 99 MMOL/L
CO2 SERPL-SCNC: 21 MMOL/L
CREAT SERPL-MCNC: 0.86 MG/DL
CRP SERPL HS-MCNC: 0.55 MG/L
EGFR: 94 ML/MIN/1.73M2
ERYTHROCYTE [SEDIMENTATION RATE] IN BLOOD BY WESTERGREN METHOD: 22 MM/HR
GLUCOSE SERPL-MCNC: 67 MG/DL
HCT VFR BLD CALC: 44.2 %
HGB BLD-MCNC: 15 G/DL
MCHC RBC-ENTMCNC: 29.6 PG
MCHC RBC-ENTMCNC: 33.9 GM/DL
MCV RBC AUTO: 87.2 FL
PLATELET # BLD AUTO: 229 K/UL
POTASSIUM SERPL-SCNC: 4.9 MMOL/L
PROT SERPL-MCNC: 7.6 G/DL
RBC # BLD: 5.07 M/UL
RBC # FLD: 13.1 %
SODIUM SERPL-SCNC: 138 MMOL/L
WBC # FLD AUTO: 8.4 K/UL

## 2024-01-11 ENCOUNTER — LABORATORY RESULT (OUTPATIENT)
Age: 69
End: 2024-01-11

## 2024-01-11 ENCOUNTER — APPOINTMENT (OUTPATIENT)
Dept: RHEUMATOLOGY | Facility: CLINIC | Age: 69
End: 2024-01-11
Payer: MEDICARE

## 2024-01-11 VITALS
OXYGEN SATURATION: 98 % | BODY MASS INDEX: 24.08 KG/M2 | HEIGHT: 71 IN | DIASTOLIC BLOOD PRESSURE: 70 MMHG | SYSTOLIC BLOOD PRESSURE: 111 MMHG | HEART RATE: 86 BPM | WEIGHT: 172 LBS

## 2024-01-11 LAB
CK SERPL-CCNC: 102 U/L
CRP SERPL-MCNC: <3 MG/L
ERYTHROCYTE [SEDIMENTATION RATE] IN BLOOD BY WESTERGREN METHOD: 8 MM/HR
RHEUMATOID FACT SER QL: <10 IU/ML

## 2024-01-11 PROCEDURE — 99204 OFFICE O/P NEW MOD 45 MIN: CPT

## 2024-01-11 RX ORDER — LEVOTHYROXINE SODIUM 0.17 MG/1
TABLET ORAL
Refills: 0 | Status: ACTIVE | COMMUNITY

## 2024-01-16 LAB
CCP AB SER IA-ACNC: <8 UNITS
RF+CCP IGG SER-IMP: NEGATIVE

## 2024-01-18 DIAGNOSIS — M25.512 PAIN IN RIGHT SHOULDER: ICD-10-CM

## 2024-01-18 DIAGNOSIS — M25.511 PAIN IN RIGHT SHOULDER: ICD-10-CM

## 2024-01-18 NOTE — ASSESSMENT
[FreeTextEntry1] : Shoulder pain, bilateral No response to NSAIDS, Tylenol, PT, shoulder steroid injections.  Plan: -updated shoulder xrays  -RF CCP ESR CRP -CT shoulders (cannot get MRI due to ICD) -RTC after above

## 2024-01-18 NOTE — PHYSICAL EXAM
[General Appearance - Alert] : alert [General Appearance - In No Acute Distress] : in no acute distress [General Appearance - Well Nourished] : well nourished [FreeTextEntry1] : shoulders: range of motion to 90 degrees abduction, past that with pain, + empty can, + neer  [] : no rash [Sensation] : the sensory exam was normal to light touch and pinprick [Motor Exam] : the motor exam was normal [Oriented To Time, Place, And Person] : oriented to person, place, and time

## 2024-01-18 NOTE — HISTORY OF PRESENT ILLNESS
[FreeTextEntry1] :  4 years of shoulder pain, neck pain. Takes Aleve 400mg daily, per pt ok by his cardiologist since pt has CAD. Also takes Tylenol PT tried in the past did not help Pain management - had given shoulder injections which helped only for a few hours Acupuncture Massage Cannot sleep on the side due ot pain No other joint pain No joint swelling No hip girdle stiffness  No preceding URI; no h/o pso, ibd, uveitis. No numbness tingling down arms  Cannot get MRI due to MRI incompatible ICD   Occupation:  (retired) FH: No personal or family history of autoimmune disease.

## 2024-01-19 ENCOUNTER — APPOINTMENT (OUTPATIENT)
Dept: RADIOLOGY | Facility: IMAGING CENTER | Age: 69
End: 2024-01-19
Payer: MEDICARE

## 2024-01-19 ENCOUNTER — OUTPATIENT (OUTPATIENT)
Dept: OUTPATIENT SERVICES | Facility: HOSPITAL | Age: 69
LOS: 1 days | End: 2024-01-19
Payer: COMMERCIAL

## 2024-01-19 DIAGNOSIS — Z98.890 OTHER SPECIFIED POSTPROCEDURAL STATES: Chronic | ICD-10-CM

## 2024-01-19 DIAGNOSIS — Z95.810 PRESENCE OF AUTOMATIC (IMPLANTABLE) CARDIAC DEFIBRILLATOR: Chronic | ICD-10-CM

## 2024-01-19 DIAGNOSIS — Z95.5 PRESENCE OF CORONARY ANGIOPLASTY IMPLANT AND GRAFT: Chronic | ICD-10-CM

## 2024-01-19 DIAGNOSIS — Z00.8 ENCOUNTER FOR OTHER GENERAL EXAMINATION: ICD-10-CM

## 2024-01-19 PROCEDURE — 73030 X-RAY EXAM OF SHOULDER: CPT

## 2024-01-19 PROCEDURE — 73030 X-RAY EXAM OF SHOULDER: CPT | Mod: 26,LT,RT

## 2024-01-26 ENCOUNTER — APPOINTMENT (OUTPATIENT)
Dept: COLORECTAL SURGERY | Facility: CLINIC | Age: 69
End: 2024-01-26
Payer: MEDICARE

## 2024-01-26 PROCEDURE — 99213 OFFICE O/P EST LOW 20 MIN: CPT | Mod: 25

## 2024-01-26 PROCEDURE — 46600 DIAGNOSTIC ANOSCOPY SPX: CPT

## 2024-01-26 NOTE — PHYSICAL EXAM
[Excoriation] : no perianal excoriation [Fistula] : no fistulas [Wart] : no warts [Ulcer ___ cm] : no ulcers [Normal] : was normal [None] : there was no rectal mass  [Normal Breath Sounds] : Normal breath sounds [Normal Heart Sounds] : normal heart sounds [Normal Rate and Rhythm] : normal rate and rhythm [Alert] : alert [Oriented to Person] : oriented to person [Oriented to Place] : oriented to place [Oriented to Time] : oriented to time [Calm] : calm [de-identified] : flat soft +BS NT/ND [de-identified] : Enlarged external hemorrhoids [de-identified] : Anoscopy reveals very large internal hemorrhoids [de-identified] : well nourished male [de-identified] : NC/AT [de-identified] : +ROM [de-identified] : intact

## 2024-01-26 NOTE — ASSESSMENT
[FreeTextEntry1] : We had previously discussed THD and he had scheduled it but then got nervous and canceled.  His hemorrhoids have grown since the last time I saw him.  We again discussed the options but this time we discussed excisional hemorrhoidectomy.  He reports that he suffers on a daily basis and would like to do the most definitive treatment possible which I believe would be a hemorrhoidectomy.  Risks benefits and alternatives were discussed with him including but not limited to pain, bleeding, infection.  He understands and agrees to proceed  He will obtain cardiac clearance from his cardiologist.  He was recently seen by his EP physician who states that his AICD is functioning properly

## 2024-01-26 NOTE — CONSULT LETTER
[Dear  ___] : Dear  [unfilled], [Courtesy Letter:] : I had the pleasure of seeing your patient, [unfilled], in my office today. [Please see my note below.] : Please see my note below. [Sincerely,] : Sincerely, [FreeTextEntry3] : Wilson Danielson MD, FACS, FASCRS Colorectal Surgery [facility] Assistant Professor of Surgery [employer] at [address], [address] [place] [address] Tel: [phone]  Cell: [phone]  Email: [email or web address]

## 2024-01-26 NOTE — HISTORY OF PRESENT ILLNESS
[FreeTextEntry1] : The patient presents with persistent rectal bleeding with every bowel movement.  He also has constant burning.

## 2024-01-29 ENCOUNTER — OUTPATIENT (OUTPATIENT)
Dept: OUTPATIENT SERVICES | Facility: HOSPITAL | Age: 69
LOS: 1 days | End: 2024-01-29
Payer: COMMERCIAL

## 2024-01-29 ENCOUNTER — APPOINTMENT (OUTPATIENT)
Dept: CT IMAGING | Facility: IMAGING CENTER | Age: 69
End: 2024-01-29
Payer: MEDICARE

## 2024-01-29 ENCOUNTER — RESULT REVIEW (OUTPATIENT)
Age: 69
End: 2024-01-29

## 2024-01-29 ENCOUNTER — TRANSCRIPTION ENCOUNTER (OUTPATIENT)
Age: 69
End: 2024-01-29

## 2024-01-29 DIAGNOSIS — Z95.810 PRESENCE OF AUTOMATIC (IMPLANTABLE) CARDIAC DEFIBRILLATOR: Chronic | ICD-10-CM

## 2024-01-29 DIAGNOSIS — Z95.5 PRESENCE OF CORONARY ANGIOPLASTY IMPLANT AND GRAFT: Chronic | ICD-10-CM

## 2024-01-29 DIAGNOSIS — M25.511 PAIN IN RIGHT SHOULDER: ICD-10-CM

## 2024-01-29 DIAGNOSIS — Z98.890 OTHER SPECIFIED POSTPROCEDURAL STATES: Chronic | ICD-10-CM

## 2024-01-29 PROCEDURE — 73200 CT UPPER EXTREMITY W/O DYE: CPT | Mod: 26,LT,RT

## 2024-01-29 PROCEDURE — 73200 CT UPPER EXTREMITY W/O DYE: CPT

## 2024-01-30 NOTE — H&P PST ADULT - NSANTHNECKRD_ENT_A_CORE
Overall, pt tolerated PO trials WFL, however exhibited occ subtle throat clears, which may indicate airway protection deficits. Case d/w Dr. Reyez. Given that pt is weaning off of oxygen, able to self feed, and with increasing mobility, recommend continue PO diet. Recommend modified solids due to lack of dentition, not a mechanical dysphagia. Should pt demonstrate overt s/s of aspiration with PO or require increase oxygen support, recommend d/c diet and this svc will f/u to determine need for instrumental eval. No

## 2024-01-31 ENCOUNTER — OUTPATIENT (OUTPATIENT)
Dept: OUTPATIENT SERVICES | Facility: HOSPITAL | Age: 69
LOS: 1 days | End: 2024-01-31
Payer: COMMERCIAL

## 2024-01-31 VITALS
HEART RATE: 78 BPM | RESPIRATION RATE: 18 BRPM | WEIGHT: 171.96 LBS | SYSTOLIC BLOOD PRESSURE: 106 MMHG | DIASTOLIC BLOOD PRESSURE: 66 MMHG | TEMPERATURE: 98 F | HEIGHT: 71 IN | OXYGEN SATURATION: 98 %

## 2024-01-31 DIAGNOSIS — E11.9 TYPE 2 DIABETES MELLITUS WITHOUT COMPLICATIONS: ICD-10-CM

## 2024-01-31 DIAGNOSIS — Z95.5 PRESENCE OF CORONARY ANGIOPLASTY IMPLANT AND GRAFT: Chronic | ICD-10-CM

## 2024-01-31 DIAGNOSIS — Z98.890 OTHER SPECIFIED POSTPROCEDURAL STATES: Chronic | ICD-10-CM

## 2024-01-31 DIAGNOSIS — K64.9 UNSPECIFIED HEMORRHOIDS: ICD-10-CM

## 2024-01-31 DIAGNOSIS — Z01.818 ENCOUNTER FOR OTHER PREPROCEDURAL EXAMINATION: ICD-10-CM

## 2024-01-31 DIAGNOSIS — Z95.810 PRESENCE OF AUTOMATIC (IMPLANTABLE) CARDIAC DEFIBRILLATOR: Chronic | ICD-10-CM

## 2024-01-31 DIAGNOSIS — I50.22 CHRONIC SYSTOLIC (CONGESTIVE) HEART FAILURE: ICD-10-CM

## 2024-01-31 PROCEDURE — G0463: CPT

## 2024-01-31 RX ORDER — METFORMIN HYDROCHLORIDE 850 MG/1
1 TABLET ORAL
Qty: 0 | Refills: 0 | DISCHARGE

## 2024-01-31 NOTE — H&P PST ADULT - PROBLEM SELECTOR PLAN 2
HgbA1C 6.8 on 1/17/24. Medications reviewed, last dose of Metformin to be taken the day prior to sx. FS to be done in AM day of sx.

## 2024-01-31 NOTE — H&P PST ADULT - TEMPERATURE IN CELSIUS (DEGREES C)
FAX RECEIVED FROM [employer] FOR A SECOND RX REFILL ON THE FOLLOWING:    -SODIUM SUL/SULF 9-4.5% LIQUID WASH  QTY: 454   36.6

## 2024-01-31 NOTE — H&P PST ADULT - HISTORY OF PRESENT ILLNESS
68-year-old man with past medical history of CAD (PCI x 2 12/2006), MI, Hypertension, Hyperlipidemia, BPH, Hypothyroidism, SVT and HFrEF s/p AICD placement 2007 (generator change 2014); who presents to PST today for a scheduled Hemorrhoidectomy on 2/8/24.  Denies recent fevers, chills, cough, chest pain or SOB and feels well otherwise. Cards to be seen tomorrow 2/1/24 for eval.   68-year-old man with past medical history of CAD (PCI x 2 12/2006), MI, Hypertension, Hyperlipidemia, BPH, Hypothyroidism, SVT and HFrEF s/p AICD placement 2007 (generator change 2014); who presents to PST today for a scheduled Hemorrhoidectomy on 2/8/24.  Denies recent fevers, chills, cough, chest pain or SOB and feels well otherwise. Cards to be seen tomorrow 2/1/24 for eval.    2/2/2024 cardiac evaluation reviewed with anesthesia, patient is ok to proceed by cardiologist. However, stress test is positive and compromised EF. Patient re directed to MAIN OR. Team emailed. KEANU Sharpe NP

## 2024-01-31 NOTE — H&P PST ADULT - PROBLEM SELECTOR PLAN 3
Followed by Dr Dominguez, to be seen tomorrow 2/1/24 for eval. MMF# provided to pt. Interrogation requested.

## 2024-01-31 NOTE — H&P PST ADULT - NSICDXPASTMEDICALHX_GEN_ALL_CORE_FT
PAST MEDICAL HISTORY:  Acute myocardial infarction 2006    AICD (automatic cardioverter/defibrillator) present     Chronic HFrEF (heart failure with reduced ejection fraction) LVEF 40% TTE 4/19/22    DM (diabetes mellitus) Type 2 DM - states he is no longer taking Jardiance as it is not covered by insurance    H/O hemorrhoids     History of BPH     HLD (hyperlipidemia)     HTN (hypertension)     Hypothyroid     Unspecified hemorrhoids

## 2024-01-31 NOTE — H&P PST ADULT - NSICDXPASTSURGICALHX_GEN_ALL_CORE_FT
PAST SURGICAL HISTORY:  H/O colonoscopy 14 years ago    History of automatic internal cardiac defibrillator (AICD) Medtronic [id number] - placed 2007, generator change 2014 - per pt, interrogtaed within last 6 months    Stented coronary artery x 2 - 12/2006

## 2024-02-07 ENCOUNTER — TRANSCRIPTION ENCOUNTER (OUTPATIENT)
Age: 69
End: 2024-02-07

## 2024-02-08 ENCOUNTER — RESULT REVIEW (OUTPATIENT)
Age: 69
End: 2024-02-08

## 2024-02-08 ENCOUNTER — OUTPATIENT (OUTPATIENT)
Dept: OUTPATIENT SERVICES | Facility: HOSPITAL | Age: 69
LOS: 1 days | End: 2024-02-08
Payer: COMMERCIAL

## 2024-02-08 ENCOUNTER — APPOINTMENT (OUTPATIENT)
Dept: COLORECTAL SURGERY | Facility: HOSPITAL | Age: 69
End: 2024-02-08
Payer: MEDICARE

## 2024-02-08 ENCOUNTER — TRANSCRIPTION ENCOUNTER (OUTPATIENT)
Age: 69
End: 2024-02-08

## 2024-02-08 VITALS
DIASTOLIC BLOOD PRESSURE: 81 MMHG | SYSTOLIC BLOOD PRESSURE: 133 MMHG | HEART RATE: 81 BPM | TEMPERATURE: 98 F | OXYGEN SATURATION: 98 %

## 2024-02-08 VITALS
DIASTOLIC BLOOD PRESSURE: 71 MMHG | TEMPERATURE: 98 F | SYSTOLIC BLOOD PRESSURE: 118 MMHG | OXYGEN SATURATION: 97 % | WEIGHT: 171.96 LBS | RESPIRATION RATE: 15 BRPM | HEART RATE: 81 BPM | HEIGHT: 70.98 IN

## 2024-02-08 DIAGNOSIS — Z98.890 OTHER SPECIFIED POSTPROCEDURAL STATES: Chronic | ICD-10-CM

## 2024-02-08 DIAGNOSIS — Z95.5 PRESENCE OF CORONARY ANGIOPLASTY IMPLANT AND GRAFT: Chronic | ICD-10-CM

## 2024-02-08 DIAGNOSIS — Z95.810 PRESENCE OF AUTOMATIC (IMPLANTABLE) CARDIAC DEFIBRILLATOR: Chronic | ICD-10-CM

## 2024-02-08 DIAGNOSIS — K64.9 UNSPECIFIED HEMORRHOIDS: ICD-10-CM

## 2024-02-08 LAB
GLUCOSE BLDC GLUCOMTR-MCNC: 161 MG/DL — HIGH (ref 70–99)
GLUCOSE BLDC GLUCOMTR-MCNC: 162 MG/DL — HIGH (ref 70–99)

## 2024-02-08 PROCEDURE — 46255 REMOVE INT/EXT HEM 1 GROUP: CPT

## 2024-02-08 PROCEDURE — 88304 TISSUE EXAM BY PATHOLOGIST: CPT

## 2024-02-08 PROCEDURE — 82962 GLUCOSE BLOOD TEST: CPT

## 2024-02-08 PROCEDURE — 88342 IMHCHEM/IMCYTCHM 1ST ANTB: CPT

## 2024-02-08 PROCEDURE — 88304 TISSUE EXAM BY PATHOLOGIST: CPT | Mod: 26

## 2024-02-08 PROCEDURE — 88342 IMHCHEM/IMCYTCHM 1ST ANTB: CPT | Mod: 26

## 2024-02-08 PROCEDURE — 46260 REMOVE IN/EX HEM GROUPS 2+: CPT

## 2024-02-08 PROCEDURE — C1889: CPT

## 2024-02-08 DEVICE — SURGIFOAM PAD 8CM X 12.5CM X 10MM (100): Type: IMPLANTABLE DEVICE | Status: FUNCTIONAL

## 2024-02-08 RX ORDER — SODIUM CHLORIDE 9 MG/ML
1000 INJECTION, SOLUTION INTRAVENOUS
Refills: 0 | Status: DISCONTINUED | OUTPATIENT
Start: 2024-02-08 | End: 2024-02-08

## 2024-02-08 RX ORDER — OXYCODONE HYDROCHLORIDE 5 MG/1
1 TABLET ORAL
Qty: 12 | Refills: 0
Start: 2024-02-08

## 2024-02-08 RX ORDER — ISOSORBIDE DINITRATE 5 MG/1
1 TABLET ORAL
Refills: 0 | DISCHARGE

## 2024-02-08 RX ORDER — FENTANYL CITRATE 50 UG/ML
25 INJECTION INTRAVENOUS
Refills: 0 | Status: DISCONTINUED | OUTPATIENT
Start: 2024-02-08 | End: 2024-02-08

## 2024-02-08 RX ORDER — FENTANYL CITRATE 50 UG/ML
50 INJECTION INTRAVENOUS ONCE
Refills: 0 | Status: DISCONTINUED | OUTPATIENT
Start: 2024-02-08 | End: 2024-02-08

## 2024-02-08 RX ORDER — ONDANSETRON 8 MG/1
4 TABLET, FILM COATED ORAL ONCE
Refills: 0 | Status: DISCONTINUED | OUTPATIENT
Start: 2024-02-08 | End: 2024-02-08

## 2024-02-08 RX ORDER — LIDOCAINE HCL 20 MG/ML
0.2 VIAL (ML) INJECTION ONCE
Refills: 0 | Status: DISCONTINUED | OUTPATIENT
Start: 2024-02-08 | End: 2024-02-08

## 2024-02-08 RX ORDER — ATORVASTATIN CALCIUM 80 MG/1
1 TABLET, FILM COATED ORAL
Qty: 0 | Refills: 0 | DISCHARGE

## 2024-02-08 RX ORDER — CARVEDILOL PHOSPHATE 80 MG/1
1 CAPSULE, EXTENDED RELEASE ORAL
Qty: 0 | Refills: 0 | DISCHARGE

## 2024-02-08 RX ORDER — LEVOTHYROXINE SODIUM 125 MCG
1 TABLET ORAL
Refills: 0 | DISCHARGE

## 2024-02-08 RX ORDER — TAMSULOSIN HYDROCHLORIDE 0.4 MG/1
1 CAPSULE ORAL
Qty: 0 | Refills: 0 | DISCHARGE

## 2024-02-08 RX ORDER — ASPIRIN/CALCIUM CARB/MAGNESIUM 324 MG
1 TABLET ORAL
Refills: 0 | DISCHARGE

## 2024-02-08 RX ORDER — METFORMIN HYDROCHLORIDE 850 MG/1
1 TABLET ORAL
Refills: 0 | DISCHARGE

## 2024-02-08 RX ORDER — LOSARTAN POTASSIUM 100 MG/1
1 TABLET, FILM COATED ORAL
Qty: 0 | Refills: 0 | DISCHARGE

## 2024-02-08 RX ADMIN — SODIUM CHLORIDE 100 MILLILITER(S): 9 INJECTION, SOLUTION INTRAVENOUS at 09:48

## 2024-02-08 NOTE — ASU PATIENT PROFILE, ADULT - BILL OF RIGHTS/ADMISSION INFORMATION PROVIDED TO:
Marlene King)  Neurosurgery  [address] [place] [id number]  Phone: [phone]  Fax: [phone]  Established Patient  Follow Up Time:    Patient

## 2024-02-08 NOTE — ASU DISCHARGE PLAN (ADULT/PEDIATRIC) - RETURN TO WORK/SCHOOL
[facility] utilizes an encounter to take the place of a direct phone call to your office. Please take a moment to review the below request. Your reply to this encounter will act as a verbal OK of orders if requested below. Thank you.    ORDER    Skilled nursing 1 x week for 9 weeks with 2 prn    Home care to continue to flush PORT monthly when not done in clinic/hospital setting. Last flush on 3/20/18 during paracentesis procedure.     RECERT on 3/22/18  Situation...Patient alert and oriented and cognition has recently been worse over the past few weeks per caregiveres.  Client is having episodes of confusion/forgetfulness.  He is also reporting that as of late he is only having one BM per day despite receiving 10cc of lactulose QAM.  Per spouse, client confided in Mother in Law that he has been using Immodium. When confronted with this today, he reports using only once.  Spouse reports that client has done this before to prevent loose stooling.  Spouse will keep this hidden from patient. Education provided on how this is contraindicated and will result in ammonia build up, increased confusion, potential hospitalization, as well as hardening of stool/straining for BM.  Client did meet with colorectal surgeon and he does have large varices in the colon.  Due to cleints low platelet count, there is no surgical intervention for this.  Therefore it is important for stools to remain loose.  Spouse will start to give lactulose BID until client becomes regular again with 8 to 10 loose stools per day. Client verbalized understanding to edu provided.  Client resides in a town house with spouse who is his PCA. Clients mother in law is also his PCA.  VSS,  lungs CTA.  Dry cough persists with minor improvement.  Over this past CERT period, client has been averaging a paracentesis every other week.  When ascites build up, client experiences weight gain, decrease in appetite, and occaional abdominal pain with some SOB.   He had 8L taken off on 3/20/18.  Post procedure he complianed of a lot of LLE/groin cramping and in general not feeling well.  He has also had a decline in appetite since the procedure.  Spouse reports one meal per day and over the past 24 hours only eating sherbert. Writer educated on small/freuqent meals and ensuring adequate water intake.  Client has standing orders to have paracentesis done at [facility].  Weight today is 204 lbs which is up from 213.2lbs last CERT. Over this past CERT period he has had two more upper endoscopies for binding of varices.  As of last procedure, they feel they have gotten them all.  He does have a repeat endoscopy in Mid April.  Client has been seen by podiatry over this past CERT period and has developed some right foot pain since the office visit.  Unclear if this is neuropathy or associated with foot spasms. Denies presence of this pain over the past two weeks.  Client directed to return to podiatry if pain worsens.  Spouse and Mother in Law continue to pass meds to ensure these are being taken correctly and on time.  Skin CDI, feet assessed with no concerns.  Blood sugars have have improved over this past CERT period ranging from 90 to 130.  Today this was at 87.  Client denies recent headaches.   Ambulating without assistance  however unsteady on his  feet and use of  walls and furniture. Client does have a cane to use as needed and was seen by PT over this past CERT period with education on use. Client has remained free of falls over this past CERT period.  Home care will continue to flush port monthly except when he has this done in clinic/hospital setting.   Client has not needed this done by homecare over this past CERT period as port is being accessed with each paracentesis.  Client also continues to meet with his councelor and reports this is going well. He has expressed to spouse and writer that he feels he is dying/body shutting down. Writer has encouraged  cleint to discuss this with his councelor.  Active listening provided today by writer. Client is sleeping approx 10 hours per night and denies concerns with sleep.  Background Hx of type 2  diabetes without mention of complication, heptatic encephalopathy, Alcoholic cirrhosis of liver with ascites complicated by variceal bleeding and hepatic encephalopathy, gastrointestnial bleeding, malnutrition, partial epilepsy with impairment of consciousness, major depressive disorder reccurent episode/moderate,  sleep disturbances, generalized  pain, BLE edema, venous thromboembolism with SMV and portal vein occlusion on Lovenox injections  Analysis Pt is at   risk for rehospitalization d/t   high  risk medications, risk for falls, chronic   comorbidities  Recommedation SN for medication  management/education, chronic disease management/education, weight checks, compliance with BG monitoring, abdominal girth   measurements, fall assessment,   home  safety  Expected length of home care is   ongoing for assess/educatin of chronic illnesses and  medication mngmt with set up.    Angela Manley RN Case Manager  [phone]  [email or web address]       as tolerated

## 2024-02-08 NOTE — PRE-ANESTHESIA EVALUATION ADULT - NSANTHADDINFOFT_GEN_ALL_CORE
risk benefit discussion  all questions answered  will proceed w MAC -- will place magnet on ICD as surgeon will use monopolar

## 2024-02-08 NOTE — ASU DISCHARGE PLAN (ADULT/PEDIATRIC) - CARE PROVIDER_API CALL
Wilson Danielson  Colon/Rectal Surgery  [address] [place] [address], [address]  [place] [address]  Phone: [phone]  Fax: [phone]  Follow Up Time: 2 weeks

## 2024-02-08 NOTE — ASU PATIENT PROFILE, ADULT - NS TRANSFER DENTURES
[FreeTextEntry1] : The patient is a 55 year-old male with right leg cellulitis that has now resolved, and wounds have healed. I recommend use of emollient and compression therapy with ace bandage or compression stockings as tolerated. He can follow up as needed.
none

## 2024-02-08 NOTE — BRIEF OPERATIVE NOTE - OPERATION/FINDINGS
Right anterior and left lateral internal hemorrhoid excised using electrocautery. Hemostasis achieved.

## 2024-02-12 PROBLEM — E03.9 HYPOTHYROIDISM, UNSPECIFIED: Chronic | Status: ACTIVE | Noted: 2024-01-31

## 2024-02-12 PROBLEM — Z95.810 PRESENCE OF AUTOMATIC (IMPLANTABLE) CARDIAC DEFIBRILLATOR: Chronic | Status: ACTIVE | Noted: 2024-01-31

## 2024-02-12 PROBLEM — K64.9 UNSPECIFIED HEMORRHOIDS: Chronic | Status: ACTIVE | Noted: 2024-01-31

## 2024-02-21 ENCOUNTER — APPOINTMENT (OUTPATIENT)
Dept: COLORECTAL SURGERY | Facility: CLINIC | Age: 69
End: 2024-02-21
Payer: MEDICARE

## 2024-02-21 VITALS
RESPIRATION RATE: 14 BRPM | SYSTOLIC BLOOD PRESSURE: 127 MMHG | DIASTOLIC BLOOD PRESSURE: 69 MMHG | HEART RATE: 90 BPM | TEMPERATURE: 98.2 F | OXYGEN SATURATION: 99 %

## 2024-02-21 LAB — SURGICAL PATHOLOGY STUDY: SIGNIFICANT CHANGE UP

## 2024-02-21 PROCEDURE — 99024 POSTOP FOLLOW-UP VISIT: CPT

## 2024-02-21 NOTE — PHYSICAL EXAM
[Normal Breath Sounds] : Normal breath sounds [Normal Heart Sounds] : normal heart sounds [Normal Rate and Rhythm] : normal rate and rhythm [Alert] : alert [Oriented to Person] : oriented to person [Oriented to Place] : oriented to place [Oriented to Time] : oriented to time [Calm] : calm [de-identified] : flat soft +BS NT/ND [de-identified] : Right anterior and left lateral wounds healing well.  Visible Vicryl suture that is excised.  Moderate edema in the area [de-identified] : well nourished male [de-identified] : NC/AT [de-identified] : +ROM [de-identified] : intact

## 2024-02-21 NOTE — ASSESSMENT
[FreeTextEntry1] : s/p hemorrhoidectomy Healing well Sitz bath's High-fiber diet Follow-up in 3 weeks

## 2024-02-27 ENCOUNTER — APPOINTMENT (OUTPATIENT)
Dept: RHEUMATOLOGY | Facility: CLINIC | Age: 69
End: 2024-02-27

## 2024-03-06 ENCOUNTER — APPOINTMENT (OUTPATIENT)
Dept: ORTHOPEDIC SURGERY | Facility: CLINIC | Age: 69
End: 2024-03-06
Payer: MEDICARE

## 2024-03-06 VITALS
HEIGHT: 70 IN | HEART RATE: 81 BPM | SYSTOLIC BLOOD PRESSURE: 108 MMHG | DIASTOLIC BLOOD PRESSURE: 63 MMHG | BODY MASS INDEX: 24.05 KG/M2 | OXYGEN SATURATION: 98 % | WEIGHT: 168 LBS | TEMPERATURE: 98.1 F

## 2024-03-06 DIAGNOSIS — M67.912 UNSPECIFIED DISORDER OF SYNOVIUM AND TENDON, LEFT SHOULDER: ICD-10-CM

## 2024-03-06 DIAGNOSIS — M67.911 UNSPECIFIED DISORDER OF SYNOVIUM AND TENDON, RIGHT SHOULDER: ICD-10-CM

## 2024-03-06 DIAGNOSIS — M19.012 PRIMARY OSTEOARTHRITIS, RIGHT SHOULDER: ICD-10-CM

## 2024-03-06 DIAGNOSIS — M19.011 PRIMARY OSTEOARTHRITIS, RIGHT SHOULDER: ICD-10-CM

## 2024-03-06 PROCEDURE — 99203 OFFICE O/P NEW LOW 30 MIN: CPT | Mod: 25

## 2024-03-06 PROCEDURE — 73030 X-RAY EXAM OF SHOULDER: CPT | Mod: RT

## 2024-03-13 ENCOUNTER — APPOINTMENT (OUTPATIENT)
Dept: COLORECTAL SURGERY | Facility: CLINIC | Age: 69
End: 2024-03-13
Payer: MEDICARE

## 2024-03-13 DIAGNOSIS — K64.1 SECOND DEGREE HEMORRHOIDS: ICD-10-CM

## 2024-03-13 DIAGNOSIS — K64.4 RESIDUAL HEMORRHOIDAL SKIN TAGS: ICD-10-CM

## 2024-03-13 PROCEDURE — 99024 POSTOP FOLLOW-UP VISIT: CPT

## 2024-03-13 NOTE — PHYSICAL EXAM
[Normal Breath Sounds] : Normal breath sounds [Normal Heart Sounds] : normal heart sounds [Normal Rate and Rhythm] : normal rate and rhythm [Alert] : alert [Oriented to Person] : oriented to person [Oriented to Place] : oriented to place [Oriented to Time] : oriented to time [Calm] : calm [de-identified] : flat soft +BS NT/ND [de-identified] : Right anterior and left lateral wounds healed well.   [de-identified] : NC/AT [de-identified] : well nourished male [de-identified] : +ROM [de-identified] : intact

## 2024-03-13 NOTE — HISTORY OF PRESENT ILLNESS
[FreeTextEntry1] : The patient feels well with no pain and no drainage.  He just has occasional constipation with straining

## 2024-04-23 ENCOUNTER — EMERGENCY (EMERGENCY)
Facility: HOSPITAL | Age: 69
LOS: 0 days | Discharge: HOME | End: 2024-04-24
Attending: EMERGENCY MEDICINE
Payer: MEDICARE

## 2024-04-23 VITALS
WEIGHT: 164.91 LBS | RESPIRATION RATE: 16 BRPM | HEIGHT: 70 IN | OXYGEN SATURATION: 96 % | HEART RATE: 100 BPM | DIASTOLIC BLOOD PRESSURE: 68 MMHG | TEMPERATURE: 98 F | SYSTOLIC BLOOD PRESSURE: 104 MMHG

## 2024-04-23 DIAGNOSIS — R05.9 COUGH, UNSPECIFIED: ICD-10-CM

## 2024-04-23 DIAGNOSIS — Z95.5 PRESENCE OF CORONARY ANGIOPLASTY IMPLANT AND GRAFT: Chronic | ICD-10-CM

## 2024-04-23 DIAGNOSIS — R42 DIZZINESS AND GIDDINESS: ICD-10-CM

## 2024-04-23 DIAGNOSIS — I11.0 HYPERTENSIVE HEART DISEASE WITH HEART FAILURE: ICD-10-CM

## 2024-04-23 DIAGNOSIS — J06.9 ACUTE UPPER RESPIRATORY INFECTION, UNSPECIFIED: ICD-10-CM

## 2024-04-23 DIAGNOSIS — M54.2 CERVICALGIA: ICD-10-CM

## 2024-04-23 DIAGNOSIS — Z95.810 PRESENCE OF AUTOMATIC (IMPLANTABLE) CARDIAC DEFIBRILLATOR: ICD-10-CM

## 2024-04-23 DIAGNOSIS — Z83.1 FAMILY HISTORY OF OTHER INFECTIOUS AND PARASITIC DISEASES: ICD-10-CM

## 2024-04-23 DIAGNOSIS — E78.5 HYPERLIPIDEMIA, UNSPECIFIED: ICD-10-CM

## 2024-04-23 DIAGNOSIS — B97.89 OTHER VIRAL AGENTS AS THE CAUSE OF DISEASES CLASSIFIED ELSEWHERE: ICD-10-CM

## 2024-04-23 DIAGNOSIS — I50.9 HEART FAILURE, UNSPECIFIED: ICD-10-CM

## 2024-04-23 DIAGNOSIS — Z98.890 OTHER SPECIFIED POSTPROCEDURAL STATES: Chronic | ICD-10-CM

## 2024-04-23 DIAGNOSIS — Z95.810 PRESENCE OF AUTOMATIC (IMPLANTABLE) CARDIAC DEFIBRILLATOR: Chronic | ICD-10-CM

## 2024-04-23 DIAGNOSIS — I44.7 LEFT BUNDLE-BRANCH BLOCK, UNSPECIFIED: ICD-10-CM

## 2024-04-23 DIAGNOSIS — Z86.16 PERSONAL HISTORY OF COVID-19: ICD-10-CM

## 2024-04-23 DIAGNOSIS — E11.9 TYPE 2 DIABETES MELLITUS WITHOUT COMPLICATIONS: ICD-10-CM

## 2024-04-23 LAB
BASOPHILS NFR BLD AUTO: 0.3 % — SIGNIFICANT CHANGE UP (ref 0–2)
EOSINOPHIL # BLD AUTO: 0.1 K/UL — SIGNIFICANT CHANGE UP (ref 0–0.5)
EOSINOPHIL NFR BLD AUTO: 1.4 % — SIGNIFICANT CHANGE UP (ref 0–6)
IMM GRANULOCYTES NFR BLD AUTO: 0.3 % — SIGNIFICANT CHANGE UP (ref 0–0.9)
LYMPHOCYTES # BLD AUTO: 4.13 K/UL — HIGH (ref 1–3.3)
LYMPHOCYTES # BLD AUTO: 57.4 % — HIGH (ref 13–44)
MONOCYTES # BLD AUTO: 0.66 K/UL — SIGNIFICANT CHANGE UP (ref 0–0.9)
MONOCYTES NFR BLD AUTO: 9.2 % — SIGNIFICANT CHANGE UP (ref 2–14)
NEUTROPHILS # BLD AUTO: 2.27 K/UL — SIGNIFICANT CHANGE UP (ref 1.8–7.4)
NEUTROPHILS NFR BLD AUTO: 31.4 % — LOW (ref 43–77)

## 2024-04-23 PROCEDURE — 93010 ELECTROCARDIOGRAM REPORT: CPT

## 2024-04-23 PROCEDURE — 99285 EMERGENCY DEPT VISIT HI MDM: CPT

## 2024-04-23 NOTE — ED PROVIDER NOTE - OBJECTIVE STATEMENT
68 year old man hx of HTN, HLD, DM, CHF with AICD who presents to the ER c/o lightheadedness.  He has been having cough x 3 days and states that he had a positive covid test.  His wife was diagnosed with covid a few days before.  His daughter states that for the past 1.5 days he had generalized weakness.  No syncope.  He reports that he has also had bilateral neck pain that occurs only with movement.  He denies chest pain.

## 2024-04-23 NOTE — ED ADULT NURSE REASSESSMENT NOTE - NS ED NURSE REASSESS COMMENT FT1
Pt reassessed, as per pt he feels better and wants to go home. advised to stay to be seen by doctor. VS rechecked.

## 2024-04-23 NOTE — ED PROVIDER NOTE - NSFOLLOWUPINSTRUCTIONS_ED_ALL_ED_FT
1) Take tylenol and/or motrin for pain  2) Get plenty of rest  3) Take over the counter cough medication as needed as instructed  4) Follow up with your primary care doctor  5) Return to the ER for worsening or concerning symptoms

## 2024-04-23 NOTE — ED ADULT TRIAGE NOTE - CHIEF COMPLAINT QUOTE
complaining of weakness, lightheaded, cough, "tightness" at the back of the neck. tested + covid 3 days ago. h/o HTN, DM, BPH, AICD, RA.

## 2024-04-23 NOTE — ED PROVIDER NOTE - PATIENT PORTAL LINK FT
You can access the FollowMyHealth Patient Portal offered by [facility] by registering at the following website: [email or web address]. By joining "[employer]"’s FollowMyHealth portal, you will also be able to view your health information using other applications (apps) compatible with our system.

## 2024-04-23 NOTE — ED PROVIDER NOTE - CLINICAL SUMMARY MEDICAL DECISION MAKING FREE TEXT BOX
68 year old man recently diagnosed with covid improving cough now with lightheadedness the past 1.5 days no syncope.  Symptoms likely secondary to viral illness possible dehydration.  Low suspicion for ACS.  Will get EKG, check Labs, CXR, check Orthostatics and Re-eval. 68 year old man recently diagnosed with covid improving cough now with lightheadedness the past 1.5 days no syncope.  Symptoms likely secondary to viral illness possible dehydration.  Low suspicion for ACS.  Will get EKG, check Labs, CXR, check Orthostatics and Re-eval.    Orthostatics negative.  Troponin negative.  EKG non-ischemic.  CXR negative.  Supportive care discussed.

## 2024-04-23 NOTE — ED PROVIDER NOTE - NEUROLOGICAL, MLM
Alert and oriented, no focal deficits, no motor or sensory deficits.  CN 2-12 intact.  Normal gait no ataxia

## 2024-04-23 NOTE — ED PROVIDER NOTE - CROS ED RESP ALL NEG
[facility]      Type of Visit: Virtual: Other: telephone - [phone]    Patient is here for a new visit to discuss consult to discuss IR NEPHROSTOMY TUBE PLCMT BILLIE.        Vitals:  No vitals were obtained today due to virtual visit.      Questions patient would like addressed today are: Patient verbalized no questions/concerns, this has been communicated to the provider.     Refills are needed: No    How would you like to obtain your AVS? Mail a copy    Olivia Victoria LPN     - - -

## 2024-04-24 VITALS
RESPIRATION RATE: 16 BRPM | OXYGEN SATURATION: 100 % | SYSTOLIC BLOOD PRESSURE: 110 MMHG | HEART RATE: 88 BPM | DIASTOLIC BLOOD PRESSURE: 70 MMHG | TEMPERATURE: 98 F

## 2024-04-24 LAB
ALBUMIN SERPL ELPH-MCNC: 3.6 G/DL — SIGNIFICANT CHANGE UP (ref 3.3–5)
ALP SERPL-CCNC: 50 U/L — SIGNIFICANT CHANGE UP (ref 40–120)
ALT FLD-CCNC: 29 U/L — SIGNIFICANT CHANGE UP (ref 12–78)
ANION GAP SERPL CALC-SCNC: 9 MMOL/L — SIGNIFICANT CHANGE UP (ref 5–17)
AST SERPL-CCNC: 27 U/L — SIGNIFICANT CHANGE UP (ref 15–37)
BASOPHILS # BLD AUTO: 0.02 K/UL — SIGNIFICANT CHANGE UP (ref 0–0.2)
BILIRUB SERPL-MCNC: 0.3 MG/DL — SIGNIFICANT CHANGE UP (ref 0.2–1.2)
BUN SERPL-MCNC: 15 MG/DL — SIGNIFICANT CHANGE UP (ref 7–23)
CALCIUM SERPL-MCNC: 9.4 MG/DL — SIGNIFICANT CHANGE UP (ref 8.5–10.1)
CHLORIDE SERPL-SCNC: 104 MMOL/L — SIGNIFICANT CHANGE UP (ref 96–108)
CO2 SERPL-SCNC: 25 MMOL/L — SIGNIFICANT CHANGE UP (ref 22–31)
CREAT SERPL-MCNC: 1.1 MG/DL — SIGNIFICANT CHANGE UP (ref 0.5–1.3)
D DIMER BLD IA.RAPID-MCNC: <150 NG/ML DDU — SIGNIFICANT CHANGE UP
EGFR: 73 ML/MIN/1.73M2 — SIGNIFICANT CHANGE UP
GLUCOSE SERPL-MCNC: 103 MG/DL — HIGH (ref 70–99)
HCT VFR BLD CALC: 40.2 % — SIGNIFICANT CHANGE UP (ref 39–50)
HGB BLD-MCNC: 13.9 G/DL — SIGNIFICANT CHANGE UP (ref 13–17)
LIDOCAIN IGE QN: 115 U/L — HIGH (ref 13–75)
MAGNESIUM SERPL-MCNC: 2.2 MG/DL — SIGNIFICANT CHANGE UP (ref 1.6–2.6)
MCHC RBC-ENTMCNC: 29.1 PG — SIGNIFICANT CHANGE UP (ref 27–34)
MCHC RBC-ENTMCNC: 34.6 G/DL — SIGNIFICANT CHANGE UP (ref 32–36)
MCV RBC AUTO: 84.3 FL — SIGNIFICANT CHANGE UP (ref 80–100)
NRBC # BLD: 0 /100 WBCS — SIGNIFICANT CHANGE UP (ref 0–0)
PLATELET # BLD AUTO: 200 K/UL — SIGNIFICANT CHANGE UP (ref 150–400)
POTASSIUM SERPL-MCNC: 5 MMOL/L — SIGNIFICANT CHANGE UP (ref 3.5–5.3)
POTASSIUM SERPL-SCNC: 5 MMOL/L — SIGNIFICANT CHANGE UP (ref 3.5–5.3)
PROT SERPL-MCNC: 8.1 GM/DL — SIGNIFICANT CHANGE UP (ref 6–8.3)
RBC # BLD: 4.77 M/UL — SIGNIFICANT CHANGE UP (ref 4.2–5.8)
RBC # FLD: 13.3 % — SIGNIFICANT CHANGE UP (ref 10.3–14.5)
SODIUM SERPL-SCNC: 138 MMOL/L — SIGNIFICANT CHANGE UP (ref 135–145)
TROPONIN I, HIGH SENSITIVITY RESULT: 17.4 NG/L — SIGNIFICANT CHANGE UP
WBC # BLD: 7.2 K/UL — SIGNIFICANT CHANGE UP (ref 3.8–10.5)
WBC # FLD AUTO: 7.2 K/UL — SIGNIFICANT CHANGE UP (ref 3.8–10.5)

## 2024-04-24 PROCEDURE — 71045 X-RAY EXAM CHEST 1 VIEW: CPT | Mod: 26

## 2024-04-24 NOTE — ED ADULT NURSE NOTE - OBJECTIVE STATEMENT
Pt presents to Ed c/o weakness, cough and tightness in the back of the throat. Daughter states pt tested positive for covid 3 days ago. PMH HTN DM BPH AICD RA. Placed pt on cardiac monitor and did orthostatic blood pressures. Safety maintained.

## 2024-04-24 NOTE — ED ADULT NURSE NOTE - NSFALLUNIVINTERV_ED_ALL_ED
Bed/Stretcher in lowest position, wheels locked, appropriate side rails in place/Call bell, personal items and telephone in reach/Instruct patient to call for assistance before getting out of bed/chair/stretcher/Non-slip footwear applied when patient is off stretcher/[place] to call system/Physically safe environment - no spills, clutter or unnecessary equipment/Purposeful proactive rounding/Room/bathroom lighting operational, light cord in reach

## 2024-10-07 ENCOUNTER — NON-APPOINTMENT (OUTPATIENT)
Age: 69
End: 2024-10-07

## 2024-10-07 ENCOUNTER — APPOINTMENT (OUTPATIENT)
Dept: ELECTROPHYSIOLOGY | Facility: CLINIC | Age: 69
End: 2024-10-07
Payer: MEDICARE

## 2024-10-07 VITALS — SYSTOLIC BLOOD PRESSURE: 114 MMHG | HEART RATE: 83 BPM | DIASTOLIC BLOOD PRESSURE: 70 MMHG

## 2024-10-07 DIAGNOSIS — I50.9 HEART FAILURE, UNSPECIFIED: ICD-10-CM

## 2024-10-07 PROCEDURE — 93283 PRGRMG EVAL IMPLANTABLE DFB: CPT

## 2024-10-07 RX ORDER — TAMSULOSIN HYDROCHLORIDE 0.4 MG/1
0.4 CAPSULE ORAL
Refills: 0 | Status: ACTIVE | COMMUNITY

## 2024-11-14 ENCOUNTER — OUTPATIENT (OUTPATIENT)
Dept: OUTPATIENT SERVICES | Facility: HOSPITAL | Age: 69
LOS: 1 days | End: 2024-11-14
Payer: COMMERCIAL

## 2024-11-14 VITALS
OXYGEN SATURATION: 97 % | DIASTOLIC BLOOD PRESSURE: 65 MMHG | TEMPERATURE: 98 F | HEIGHT: 70 IN | SYSTOLIC BLOOD PRESSURE: 105 MMHG | WEIGHT: 171.96 LBS | RESPIRATION RATE: 16 BRPM | HEART RATE: 84 BPM

## 2024-11-14 DIAGNOSIS — M19.012 PRIMARY OSTEOARTHRITIS, LEFT SHOULDER: ICD-10-CM

## 2024-11-14 DIAGNOSIS — Z95.5 PRESENCE OF CORONARY ANGIOPLASTY IMPLANT AND GRAFT: Chronic | ICD-10-CM

## 2024-11-14 DIAGNOSIS — M13.812 OTHER SPECIFIED ARTHRITIS, LEFT SHOULDER: ICD-10-CM

## 2024-11-14 DIAGNOSIS — Z01.818 ENCOUNTER FOR OTHER PREPROCEDURAL EXAMINATION: ICD-10-CM

## 2024-11-14 DIAGNOSIS — Z98.890 OTHER SPECIFIED POSTPROCEDURAL STATES: Chronic | ICD-10-CM

## 2024-11-14 DIAGNOSIS — Z95.810 PRESENCE OF AUTOMATIC (IMPLANTABLE) CARDIAC DEFIBRILLATOR: Chronic | ICD-10-CM

## 2024-11-14 LAB
ALBUMIN SERPL ELPH-MCNC: 3.9 G/DL — SIGNIFICANT CHANGE UP (ref 3.3–5)
ALP SERPL-CCNC: 53 U/L — SIGNIFICANT CHANGE UP (ref 40–120)
ALT FLD-CCNC: 25 U/L — SIGNIFICANT CHANGE UP (ref 12–78)
ANION GAP SERPL CALC-SCNC: 6 MMOL/L — SIGNIFICANT CHANGE UP (ref 5–17)
AST SERPL-CCNC: 16 U/L — SIGNIFICANT CHANGE UP (ref 15–37)
BILIRUB SERPL-MCNC: 0.2 MG/DL — SIGNIFICANT CHANGE UP (ref 0.2–1.2)
BUN SERPL-MCNC: 18 MG/DL — SIGNIFICANT CHANGE UP (ref 7–23)
CALCIUM SERPL-MCNC: 9.5 MG/DL — SIGNIFICANT CHANGE UP (ref 8.5–10.1)
CHLORIDE SERPL-SCNC: 107 MMOL/L — SIGNIFICANT CHANGE UP (ref 96–108)
CO2 SERPL-SCNC: 27 MMOL/L — SIGNIFICANT CHANGE UP (ref 22–31)
CREAT SERPL-MCNC: 1 MG/DL — SIGNIFICANT CHANGE UP (ref 0.5–1.3)
EGFR: 81 ML/MIN/1.73M2 — SIGNIFICANT CHANGE UP
GLUCOSE SERPL-MCNC: 107 MG/DL — HIGH (ref 70–99)
HCT VFR BLD CALC: 41.9 % — SIGNIFICANT CHANGE UP (ref 39–50)
HGB BLD-MCNC: 14.4 G/DL — SIGNIFICANT CHANGE UP (ref 13–17)
MCHC RBC-ENTMCNC: 29.6 PG — SIGNIFICANT CHANGE UP (ref 27–34)
MCHC RBC-ENTMCNC: 34.4 G/DL — SIGNIFICANT CHANGE UP (ref 32–36)
MCV RBC AUTO: 86.2 FL — SIGNIFICANT CHANGE UP (ref 80–100)
NRBC # BLD: 0 /100 WBCS — SIGNIFICANT CHANGE UP (ref 0–0)
PLATELET # BLD AUTO: 190 K/UL — SIGNIFICANT CHANGE UP (ref 150–400)
POTASSIUM SERPL-MCNC: 4.1 MMOL/L — SIGNIFICANT CHANGE UP (ref 3.5–5.3)
POTASSIUM SERPL-SCNC: 4.1 MMOL/L — SIGNIFICANT CHANGE UP (ref 3.5–5.3)
PROT SERPL-MCNC: 7.7 G/DL — SIGNIFICANT CHANGE UP (ref 6–8.3)
RBC # BLD: 4.86 M/UL — SIGNIFICANT CHANGE UP (ref 4.2–5.8)
RBC # FLD: 13.2 % — SIGNIFICANT CHANGE UP (ref 10.3–14.5)
SODIUM SERPL-SCNC: 140 MMOL/L — SIGNIFICANT CHANGE UP (ref 135–145)
WBC # BLD: 7.73 K/UL — SIGNIFICANT CHANGE UP (ref 3.8–10.5)
WBC # FLD AUTO: 7.73 K/UL — SIGNIFICANT CHANGE UP (ref 3.8–10.5)

## 2024-11-14 PROCEDURE — 80053 COMPREHEN METABOLIC PANEL: CPT

## 2024-11-14 PROCEDURE — 83036 HEMOGLOBIN GLYCOSYLATED A1C: CPT

## 2024-11-14 PROCEDURE — 86850 RBC ANTIBODY SCREEN: CPT

## 2024-11-14 PROCEDURE — 73030 X-RAY EXAM OF SHOULDER: CPT

## 2024-11-14 PROCEDURE — 85027 COMPLETE CBC AUTOMATED: CPT

## 2024-11-14 PROCEDURE — 87640 STAPH A DNA AMP PROBE: CPT

## 2024-11-14 PROCEDURE — 73030 X-RAY EXAM OF SHOULDER: CPT | Mod: 26,LT

## 2024-11-14 PROCEDURE — 87641 MR-STAPH DNA AMP PROBE: CPT

## 2024-11-14 PROCEDURE — 86901 BLOOD TYPING SEROLOGIC RH(D): CPT

## 2024-11-14 PROCEDURE — 86900 BLOOD TYPING SEROLOGIC ABO: CPT

## 2024-11-14 PROCEDURE — G0463: CPT

## 2024-11-14 PROCEDURE — 36415 COLL VENOUS BLD VENIPUNCTURE: CPT

## 2024-11-14 RX ORDER — GLUCAGON INJECTION, SOLUTION 0.5 MG/.1ML
1 INJECTION, SOLUTION SUBCUTANEOUS ONCE
Refills: 0 | Status: DISCONTINUED | OUTPATIENT
Start: 2024-11-19 | End: 2024-11-20

## 2024-11-14 RX ORDER — MUPIROCIN 20 MG/G
1 OINTMENT TOPICAL
Qty: 15 | Refills: 0
Start: 2024-11-14 | End: 2024-11-18

## 2024-11-14 RX ORDER — 0.9 % SODIUM CHLORIDE 0.9 %
1000 INTRAVENOUS SOLUTION INTRAVENOUS
Refills: 0 | Status: DISCONTINUED | OUTPATIENT
Start: 2024-11-19 | End: 2024-11-20

## 2024-11-14 NOTE — H&P PST ADULT - PROBLEM SELECTOR PLAN 2
Nose culture resulted as invalid. Rx for mupirocin ointment sent to pt's pharmacy for prophylaxis on 11/18/24. Spoke with pt and pt's wife and reinforced how to use medication as directed. Pt and pt's wife verbalized understanding.

## 2024-11-14 NOTE — H&P PST ADULT - NSICDXFAMILYHX_GEN_ALL_CORE_FT
FAMILY HISTORY:  Father  Still living? No  FH: heart attack, Age at diagnosis: Age Unknown    Mother  Still living? No  FHx: hypertension, Age at diagnosis: Age Unknown

## 2024-11-14 NOTE — H&P PST ADULT - HISTORY OF PRESENT ILLNESS
70 yo male with Cardiac Stents Defibrillator DM  scheduled for Left reverse Total Shoulder Replacement  on 11/19/24 with Dr Bowens.  Patient states he has pain in left shoulder for 7 years.  The pain is 8/10 worse with movement and wakes patient up at night. 68 yo male with Cardiac Stents Defibrillator DM  HTN HLD CHF LBBB BPH  Arthritis scheduled for Left Reverse Total Shoulder Replacement  on 11/19/24 with Dr Bowens.  Patient states he has pain in left shoulder for 7 years.  The pain is 8/10 worse with movement and wakes patient up at night. Patient bee decreased range of motion.  Right Hand Dominant

## 2024-11-14 NOTE — H&P PST ADULT - NSICDXPASTSURGICALHX_GEN_ALL_CORE_FT
PAST SURGICAL HISTORY:  H/O colonoscopy 14 years ago    History of automatic internal cardiac defibrillator (AICD) Medtronic [id number] - placed 2007, generator change 2014 - per pt, interrogtaed within last 6 months    Stented coronary artery x 2 - 12/2006

## 2024-11-14 NOTE — H&P PST ADULT - ASSESSMENT
70 yo male with Cardiac Stents Defibrillator DM  HTN HLD CHF LBBB BPH  Arthritis scheduled for Left Reverse Total Shoulder Replacement  on 11/19/24 with Dr Bowens.

## 2024-11-14 NOTE — H&P PST ADULT - PROBLEM SELECTOR PLAN 1
check labs cbc cmp type and screen hgb a1c mrsa  ekg  xray left shoulder  medical clearance   cardiology clearance  pacemaker interrogation  preop instructions were given  hgb a1c was done - patient is aware that if results are 9 or greater he will need to see an endocrinologist  currently does not have an endocrinologist   mrsa was done patient was prescribed mupirocin ointment to be applied twice a day for 5 days today since surgery is 11/19/24 and results are pending patient was given written and verbal instructions  Patient was advised not to take Januvia the day of surgery, Last dose of Metformin 11/17  morning of surgery take Carvedilol, Aspirin, Isosorbide with a tiny sip of water   Hibiclens sponges with written and verbal instructions were given  patient and his wife verbalize understanding of instructions

## 2024-11-14 NOTE — H&P PST ADULT - NSICDXPASTMEDICALHX_GEN_ALL_CORE_FT
PAST MEDICAL HISTORY:  Acute myocardial infarction 2006    AICD (automatic cardioverter/defibrillator) present     Arthritis of left shoulder region     Chronic HFrEF (heart failure with reduced ejection fraction) LVEF 40% TTE 4/19/22    DM (diabetes mellitus) Type 2 DM - states he is no longer taking Jardiance as it is not covered by insurance    H/O hemorrhoids     H/O left bundle branch block     History of BPH     HLD (hyperlipidemia)     HTN (hypertension)     Hypothyroid     Unspecified hemorrhoids

## 2024-11-15 LAB
A1C WITH ESTIMATED AVERAGE GLUCOSE RESULT: 6.3 % — HIGH (ref 4–5.6)
ESTIMATED AVERAGE GLUCOSE: 134 MG/DL — HIGH (ref 68–114)
MRSA PCR RESULT.: SIGNIFICANT CHANGE UP
S AUREUS DNA NOSE QL NAA+PROBE: SIGNIFICANT CHANGE UP

## 2024-11-18 NOTE — ASU PATIENT PROFILE, ADULT - NSICDXPASTSURGICALHX_GEN_ALL_CORE_FT
PAST SURGICAL HISTORY:  H/O colonoscopy 14 years ago    History of automatic internal cardiac defibrillator (AICD) Medtronic [id number] - placed 2007, generator change 2014 - per pt, interrogtaed within last 6 months    Stented coronary artery x 2 - 12/2006

## 2024-11-19 ENCOUNTER — INPATIENT (INPATIENT)
Facility: HOSPITAL | Age: 69
LOS: 0 days | Discharge: HOME | DRG: 483 | End: 2024-11-20
Attending: ORTHOPAEDIC SURGERY | Admitting: ORTHOPAEDIC SURGERY
Payer: COMMERCIAL

## 2024-11-19 ENCOUNTER — TRANSCRIPTION ENCOUNTER (OUTPATIENT)
Age: 69
End: 2024-11-19

## 2024-11-19 VITALS
WEIGHT: 171.96 LBS | TEMPERATURE: 98 F | HEART RATE: 81 BPM | SYSTOLIC BLOOD PRESSURE: 128 MMHG | DIASTOLIC BLOOD PRESSURE: 81 MMHG | OXYGEN SATURATION: 97 % | RESPIRATION RATE: 12 BRPM | HEIGHT: 70 IN

## 2024-11-19 DIAGNOSIS — Z95.810 PRESENCE OF AUTOMATIC (IMPLANTABLE) CARDIAC DEFIBRILLATOR: Chronic | ICD-10-CM

## 2024-11-19 DIAGNOSIS — Z95.5 PRESENCE OF CORONARY ANGIOPLASTY IMPLANT AND GRAFT: Chronic | ICD-10-CM

## 2024-11-19 DIAGNOSIS — Z98.890 OTHER SPECIFIED POSTPROCEDURAL STATES: Chronic | ICD-10-CM

## 2024-11-19 DIAGNOSIS — Z01.818 ENCOUNTER FOR OTHER PREPROCEDURAL EXAMINATION: ICD-10-CM

## 2024-11-19 DIAGNOSIS — M13.812 OTHER SPECIFIED ARTHRITIS, LEFT SHOULDER: ICD-10-CM

## 2024-11-19 LAB
ABO RH CONFIRMATION: SIGNIFICANT CHANGE UP
ANION GAP SERPL CALC-SCNC: 8 MMOL/L — SIGNIFICANT CHANGE UP (ref 5–17)
BUN SERPL-MCNC: 15 MG/DL — SIGNIFICANT CHANGE UP (ref 7–23)
CALCIUM SERPL-MCNC: 8.7 MG/DL — SIGNIFICANT CHANGE UP (ref 8.5–10.1)
CHLORIDE SERPL-SCNC: 106 MMOL/L — SIGNIFICANT CHANGE UP (ref 96–108)
CO2 SERPL-SCNC: 25 MMOL/L — SIGNIFICANT CHANGE UP (ref 22–31)
CREAT SERPL-MCNC: 1 MG/DL — SIGNIFICANT CHANGE UP (ref 0.5–1.3)
EGFR: 81 ML/MIN/1.73M2 — SIGNIFICANT CHANGE UP
GLUCOSE SERPL-MCNC: 163 MG/DL — HIGH (ref 70–99)
HCT VFR BLD CALC: 39.4 % — SIGNIFICANT CHANGE UP (ref 39–50)
HGB BLD-MCNC: 13.6 G/DL — SIGNIFICANT CHANGE UP (ref 13–17)
MCHC RBC-ENTMCNC: 29.6 PG — SIGNIFICANT CHANGE UP (ref 27–34)
MCHC RBC-ENTMCNC: 34.5 G/DL — SIGNIFICANT CHANGE UP (ref 32–36)
MCV RBC AUTO: 85.8 FL — SIGNIFICANT CHANGE UP (ref 80–100)
NRBC # BLD: 0 /100 WBCS — SIGNIFICANT CHANGE UP (ref 0–0)
PLATELET # BLD AUTO: 170 K/UL — SIGNIFICANT CHANGE UP (ref 150–400)
POTASSIUM SERPL-MCNC: 4.4 MMOL/L — SIGNIFICANT CHANGE UP (ref 3.5–5.3)
POTASSIUM SERPL-SCNC: 4.4 MMOL/L — SIGNIFICANT CHANGE UP (ref 3.5–5.3)
RBC # BLD: 4.59 M/UL — SIGNIFICANT CHANGE UP (ref 4.2–5.8)
RBC # FLD: 13 % — SIGNIFICANT CHANGE UP (ref 10.3–14.5)
SODIUM SERPL-SCNC: 139 MMOL/L — SIGNIFICANT CHANGE UP (ref 135–145)
WBC # BLD: 11.11 K/UL — HIGH (ref 3.8–10.5)
WBC # FLD AUTO: 11.11 K/UL — HIGH (ref 3.8–10.5)

## 2024-11-19 PROCEDURE — 73030 X-RAY EXAM OF SHOULDER: CPT | Mod: 26,LT

## 2024-11-19 DEVICE — POST MODULAR 25MM: Type: IMPLANTABLE DEVICE | Status: FUNCTIONAL

## 2024-11-19 DEVICE — IMPLANTABLE DEVICE: Type: IMPLANTABLE DEVICE | Status: FUNCTIONAL

## 2024-11-19 DEVICE — SET PIN GLENOID MODULAR: Type: IMPLANTABLE DEVICE | Status: FUNCTIONAL

## 2024-11-19 DEVICE — PIN TARGETER NITINOL STRL: Type: IMPLANTABLE DEVICE | Status: FUNCTIONAL

## 2024-11-19 DEVICE — SCREW PERIP NON LOKG 4.5X36MM: Type: IMPLANTABLE DEVICE | Status: FUNCTIONAL

## 2024-11-19 RX ORDER — ONDANSETRON HYDROCHLORIDE 4 MG/1
4 TABLET, FILM COATED ORAL ONCE
Refills: 0 | Status: DISCONTINUED | OUTPATIENT
Start: 2024-11-19 | End: 2024-11-19

## 2024-11-19 RX ORDER — SITAGLIPTIN 100 MG/1
1 TABLET, FILM COATED ORAL
Refills: 0 | DISCHARGE

## 2024-11-19 RX ORDER — MAGNESIUM, ALUMINUM HYDROXIDE 200-225/5
30 SUSPENSION, ORAL (FINAL DOSE FORM) ORAL
Refills: 0 | Status: DISCONTINUED | OUTPATIENT
Start: 2024-11-19 | End: 2024-11-20

## 2024-11-19 RX ORDER — CEFAZOLIN SODIUM 10 G
2000 VIAL (EA) INJECTION ONCE
Refills: 0 | Status: COMPLETED | OUTPATIENT
Start: 2024-11-19 | End: 2024-11-19

## 2024-11-19 RX ORDER — GLUCAGON INJECTION, SOLUTION 0.5 MG/.1ML
1 INJECTION, SOLUTION SUBCUTANEOUS ONCE
Refills: 0 | Status: DISCONTINUED | OUTPATIENT
Start: 2024-11-19 | End: 2024-11-20

## 2024-11-19 RX ORDER — 0.9 % SODIUM CHLORIDE 0.9 %
1000 INTRAVENOUS SOLUTION INTRAVENOUS
Refills: 0 | Status: DISCONTINUED | OUTPATIENT
Start: 2024-11-19 | End: 2024-11-19

## 2024-11-19 RX ORDER — ISOSORBIDE DINITRATE 40 MG/1
30 TABLET ORAL THREE TIMES A DAY
Refills: 0 | Status: DISCONTINUED | OUTPATIENT
Start: 2024-11-20 | End: 2024-11-20

## 2024-11-19 RX ORDER — ONDANSETRON HYDROCHLORIDE 4 MG/1
4 TABLET, FILM COATED ORAL EVERY 6 HOURS
Refills: 0 | Status: DISCONTINUED | OUTPATIENT
Start: 2024-11-19 | End: 2024-11-20

## 2024-11-19 RX ORDER — CELECOXIB 200 MG/1
200 CAPSULE ORAL EVERY 12 HOURS
Refills: 0 | Status: DISCONTINUED | OUTPATIENT
Start: 2024-11-19 | End: 2024-11-20

## 2024-11-19 RX ORDER — SENNOSIDES 8.6 MG
2 TABLET ORAL AT BEDTIME
Refills: 0 | Status: DISCONTINUED | OUTPATIENT
Start: 2024-11-19 | End: 2024-11-20

## 2024-11-19 RX ORDER — CARVEDILOL 25 MG/1
3.12 TABLET, FILM COATED ORAL EVERY 12 HOURS
Refills: 0 | Status: DISCONTINUED | OUTPATIENT
Start: 2024-11-20 | End: 2024-11-20

## 2024-11-19 RX ORDER — HYDROMORPHONE HYDROCHLORIDE 2 MG/1
0.5 TABLET ORAL
Refills: 0 | Status: DISCONTINUED | OUTPATIENT
Start: 2024-11-19 | End: 2024-11-19

## 2024-11-19 RX ORDER — HYDROMORPHONE HYDROCHLORIDE 2 MG/1
0.5 TABLET ORAL ONCE
Refills: 0 | Status: DISCONTINUED | OUTPATIENT
Start: 2024-11-19 | End: 2024-11-20

## 2024-11-19 RX ORDER — 0.9 % SODIUM CHLORIDE 0.9 %
1000 INTRAVENOUS SOLUTION INTRAVENOUS
Refills: 0 | Status: DISCONTINUED | OUTPATIENT
Start: 2024-11-19 | End: 2024-11-20

## 2024-11-19 RX ORDER — OXYCODONE HYDROCHLORIDE 30 MG/1
5 TABLET ORAL EVERY 6 HOURS
Refills: 0 | Status: DISCONTINUED | OUTPATIENT
Start: 2024-11-19 | End: 2024-11-20

## 2024-11-19 RX ORDER — POLYETHYLENE GLYCOL 3350 17 G/17G
17 POWDER, FOR SOLUTION ORAL AT BEDTIME
Refills: 0 | Status: DISCONTINUED | OUTPATIENT
Start: 2024-11-19 | End: 2024-11-20

## 2024-11-19 RX ORDER — TAMSULOSIN HYDROCHLORIDE 0.4 MG/1
0.4 CAPSULE ORAL AT BEDTIME
Refills: 0 | Status: DISCONTINUED | OUTPATIENT
Start: 2024-11-19 | End: 2024-11-20

## 2024-11-19 RX ORDER — ACETAMINOPHEN 500MG 500 MG/1
650 TABLET, COATED ORAL EVERY 6 HOURS
Refills: 0 | Status: DISCONTINUED | OUTPATIENT
Start: 2024-11-19 | End: 2024-11-20

## 2024-11-19 RX ORDER — OXYCODONE HYDROCHLORIDE 30 MG/1
10 TABLET ORAL EVERY 6 HOURS
Refills: 0 | Status: DISCONTINUED | OUTPATIENT
Start: 2024-11-19 | End: 2024-11-20

## 2024-11-19 RX ORDER — CEFAZOLIN SODIUM 10 G
2000 VIAL (EA) INJECTION EVERY 8 HOURS
Refills: 0 | Status: COMPLETED | OUTPATIENT
Start: 2024-11-19 | End: 2024-11-20

## 2024-11-19 RX ORDER — TRAMADOL HYDROCHLORIDE 300 MG/1
50 CAPSULE ORAL EVERY 6 HOURS
Refills: 0 | Status: DISCONTINUED | OUTPATIENT
Start: 2024-11-19 | End: 2024-11-20

## 2024-11-19 RX ORDER — SODIUM CHLORIDE 9 MG/ML
1000 INJECTION, SOLUTION INTRAMUSCULAR; INTRAVENOUS; SUBCUTANEOUS
Refills: 0 | Status: DISCONTINUED | OUTPATIENT
Start: 2024-11-19 | End: 2024-11-20

## 2024-11-19 RX ADMIN — ACETAMINOPHEN 500MG 650 MILLIGRAM(S): 500 TABLET, COATED ORAL at 17:56

## 2024-11-19 RX ADMIN — Medication 75 MILLILITER(S): at 12:47

## 2024-11-19 RX ADMIN — HYDROMORPHONE HYDROCHLORIDE 0.5 MILLIGRAM(S): 2 TABLET ORAL at 13:02

## 2024-11-19 RX ADMIN — Medication 100 MILLIGRAM(S): at 17:56

## 2024-11-19 RX ADMIN — CELECOXIB 200 MILLIGRAM(S): 200 CAPSULE ORAL at 17:56

## 2024-11-19 RX ADMIN — Medication 20 MILLIGRAM(S): at 21:52

## 2024-11-19 RX ADMIN — SODIUM CHLORIDE 60 MILLILITER(S): 9 INJECTION, SOLUTION INTRAMUSCULAR; INTRAVENOUS; SUBCUTANEOUS at 14:30

## 2024-11-19 RX ADMIN — TAMSULOSIN HYDROCHLORIDE 0.4 MILLIGRAM(S): 0.4 CAPSULE ORAL at 21:51

## 2024-11-19 RX ADMIN — HYDROMORPHONE HYDROCHLORIDE 0.5 MILLIGRAM(S): 2 TABLET ORAL at 12:47

## 2024-11-19 NOTE — DISCHARGE NOTE PROVIDER - NSDCCPCAREPLAN_GEN_ALL_CORE_FT
PRINCIPAL DISCHARGE DIAGNOSIS  Diagnosis: Degenerative arthritis of left shoulder region  Assessment and Plan of Treatment:

## 2024-11-19 NOTE — DISCHARGE NOTE PROVIDER - NSDCCPTREATMENT_GEN_ALL_CORE_FT
PRINCIPAL PROCEDURE  Procedure: Reverse total shoulder replacement  Findings and Treatment: left

## 2024-11-19 NOTE — DISCHARGE NOTE PROVIDER - NSDCFUADDINST_GEN_ALL_CORE_FT
Remain in sling until follow up  Ice PRN  Tylenol 1000mg and Advil 600mg TID prior to oxycodone use  OK to shower in 3 days

## 2024-11-19 NOTE — H&P ADULT - NSHPLABSRESULTS_GEN_ALL_CORE
xrays demonstrate joint space narrowing with large inferior osteophyte formation and medialization of the glenoid

## 2024-11-19 NOTE — PHYSICAL THERAPY INITIAL EVALUATION ADULT - ADDITIONAL COMMENTS
Patient lives with family in private home, +ELISABETH and inside to bedroom. Patient was independent in all ADLs and ambulated independently without device.

## 2024-11-19 NOTE — H&P ADULT - HISTORY OF PRESENT ILLNESS
patient is 69yM with long standing left shoulder pain. Reports constant ache and discomfort in the area. Pain at night. Pain with overhead activity. He feels stiff. Denies numbness or tingling.  yes

## 2024-11-19 NOTE — DISCHARGE NOTE PROVIDER - HOSPITAL COURSE
Patient is 69yM admitted for pain control and observation for left reverse total shoulder arthroplasty Patient is 69yM admitted for pain control and observation for left reverse total shoulder arthroplasty after failing outpatient nonoperative conservative management. Patient presented to [facility] after being medically cleared for an elective surgical procedure. The patient was taken to the operating room on date mentioned above. Prophylactic antibiotics were started before the procedure and continued for 24 hours. There were no complications during the procedure and patient tolerated the procedure well. The patient was transferred to the recovery room in stable condition and subsequently to the surgical floor. The patient was placed on Aspirin 81 for anticoagulation while in house. All home medications were continued. The patient received physical therapy daily and daily labs were followed. The dressing was kept clean, dry, intact. The rest of the hospital stay was unremarkable.

## 2024-11-19 NOTE — H&P ADULT - ASSESSMENT
69yM with left shoulder arthritis  -plan for OR today, left reverse total shoulder arthroplasty  -risks, benefits, alternative discussed  -patient is medically and cardiac optimized for procedure  -reviewed rehab and recovery plans  -proceed with procedure as planned

## 2024-11-19 NOTE — DISCHARGE NOTE PROVIDER - CARE PROVIDER_API CALL
Johnson Bowens  Orthopaedic Surgery  [address] [place] [address]  Phone: [phone]  Fax: [phone]  Follow Up Time:

## 2024-11-19 NOTE — H&P ADULT - NSHPPHYSICALEXAM_GEN_ALL_CORE
Abduction: 70  ER at side: 0  ER at 90: 60  IR: sacrum  R   90/90  70 at 90  0 at side    End ROM Pain: ( - )    Strength:  Abduction: 4  External Rotation: 4  Internal Rotation: 4    Rotator Cuff Signs:  Neer: ( - )  Pollack: ( - )  Painful Arc: ( + )  Greater Tuberosity tenderness: ( +)  Drop Arm: ( - )  Superior Escape: ( + )  ER Lag: ( - )  Belly Press: ( - )  Lift Off: ( - )  Bear Hug: ( - )  + crepitus  Biceps/Labrum Signs:  Obriens: ( - )  Speeds: ( - )  Dynamic Load/Shift: ( - )  Clicking/Popping: ( - )

## 2024-11-19 NOTE — PROGRESS NOTE ADULT - SUBJECTIVE AND OBJECTIVE BOX
Postop Check    Patient tolerated the procedure well. Patient seen and examined at bedside. No acute complaints at this time. Pain well controlled. Denies chest pain, shortness of breath, nausea or vomiting.     PE:  Vital Signs Last 24 Hrs  T(C): 36.7 (11-19-24 @ 13:46), Max: 36.8 (11-19-24 @ 12:16)  T(F): 98.1 (11-19-24 @ 13:46), Max: 98.2 (11-19-24 @ 12:16)  HR: 78 (11-19-24 @ 13:46) (73 - 94)  BP: 130/78 (11-19-24 @ 13:46) (121/82 - 138/85)  BP(mean): --  RR: 16 (11-19-24 @ 13:46) (12 - 16)  SpO2: 98% (11-19-24 @ 13:46) (96% - 100%)    General: NAD, resting comfortably in bed  LUE:   Dressing C/D/I  Compartments soft and compressible  No calf tenderness bilaterally  +Axillary/Musculocutaneous/Radial/Median/AIN/PIN intact  SILT C5-T1  2+ radial pulses      A/P:  69y m s/p L rTSA POD 0  -PT/OT   -NWB on the LUE in shoulder immobilizer  -Pain Control  -DVT ppx: aspirin 81mg daily starting POD1  -Continue perioperative abx x 24 hours  -FU AM Labs  -Rest, ice, compress, and elevate the extremity as tolerated  -Incentive Spirometry  -Medical management appreciated  -Dispo per OT/PT  -D/w Dr. Bowens, will update plan as needed

## 2024-11-19 NOTE — PHYSICAL THERAPY INITIAL EVALUATION ADULT - RANGE OF MOTION EXAMINATION, REHAB EVAL
L Shoulder in immobilizer/Right UE ROM was WNL (within normal limits)/bilateral lower extremity was ROM was WNL (within normal limits)

## 2024-11-19 NOTE — H&P ADULT - NSICDXPASTSURGICALHX_GEN_ALL_CORE_FT
PAST SURGICAL HISTORY:  H/O colonoscopy 14 years ago    History of automatic internal cardiac defibrillator (AICD) Medtronic [id number] - placed 2007, generator change 2014 - per pt, interrogtaed within last 6 months    Stented coronary artery x 2 - 12/2006

## 2024-11-19 NOTE — DISCHARGE NOTE PROVIDER - NSDCMRMEDTOKEN_GEN_ALL_CORE_FT
Weekly review of blood sugars:    LAURE: 10/27/2024  Gestational Age: 13w4d    LMP: Patient's last menstrual period was 01/21/2024.    Current Diabetes Medications:  Lifestyle management    New Recommendations:   Lifestyle management    Blood sugars:  Since initial visit 4/15/2024          Blood Sugar Assessment:  See highlighted or marked for values outside range above    Dietary/Other Concerns (if any):     Patient Goals:  Monitor blood sugars 4 times daily  Follow CHO goals at meals and snacks  Blood sugar Targets:   less than 95 mg/dL fasting   less than 120 mg/dL 2 hours post meal or  less than 130 mg/dL 1 hour post meal.   Send blood sugars Weekly/BiWeekly      Aspir 81 oral delayed release tablet: 1 tab(s) orally once a day (at bedtime)  atorvastatin 20 mg oral tablet: 1 tab(s) orally once a day (at bedtime)  carvedilol 3.125 mg oral tablet: 1 tab(s) orally 2 times a day  isosorbide dinitrate 30 mg oral tablet: 1 tab(s) orally once a day (in the morning)  Januvia 100 mg oral tablet: 1 tab(s) orally once a day (at bedtime)  metFORMIN 1000 mg oral tablet: 1 tab(s) orally once a day (in the morning)  mupirocin 2% topical ointment: Apply topically to affected area 2 times a day Apply to both nostrils bid x 5 days  tamsulosin 0.4 mg oral capsule: 1 cap(s) orally once a day (at bedtime)   Aspirin EC 81 mg oral delayed release tablet: 1 tab(s) orally 2 times a day to prevent blood clots  atorvastatin 20 mg oral tablet: 1 tab(s) orally once a day (at bedtime)  carvedilol 3.125 mg oral tablet: 1 tab(s) orally 2 times a day  Colace 100 mg oral capsule: 1 cap(s) orally 4 times a day as needed for  constipation  isosorbide dinitrate 30 mg oral tablet: 1 tab(s) orally once a day (in the morning)  Januvia 100 mg oral tablet: 1 tab(s) orally once a day (at bedtime)  metFORMIN 1000 mg oral tablet: 1 tab(s) orally once a day (in the morning)  Narcan 4 mg/0.1 mL nasal spray: 1 spray(s) intranasally 4 times a day as needed for  overdose MDD: 4  ondansetron 4 mg oral tablet: 1 tab(s) orally 4 times a day as needed for  nausea MDD: 4  oxyCODONE 5 mg oral tablet: 1 tab(s) orally 4 times a day as needed for  pain MDD: 4  tamsulosin 0.4 mg oral capsule: 1 cap(s) orally once a day (at bedtime)

## 2024-11-20 ENCOUNTER — TRANSCRIPTION ENCOUNTER (OUTPATIENT)
Age: 69
End: 2024-11-20

## 2024-11-20 VITALS
HEART RATE: 91 BPM | OXYGEN SATURATION: 97 % | RESPIRATION RATE: 17 BRPM | SYSTOLIC BLOOD PRESSURE: 96 MMHG | DIASTOLIC BLOOD PRESSURE: 52 MMHG

## 2024-11-20 LAB
ANION GAP SERPL CALC-SCNC: 8 MMOL/L — SIGNIFICANT CHANGE UP (ref 5–17)
BUN SERPL-MCNC: 15 MG/DL — SIGNIFICANT CHANGE UP (ref 7–23)
CALCIUM SERPL-MCNC: 7.9 MG/DL — LOW (ref 8.5–10.1)
CHLORIDE SERPL-SCNC: 105 MMOL/L — SIGNIFICANT CHANGE UP (ref 96–108)
CO2 SERPL-SCNC: 25 MMOL/L — SIGNIFICANT CHANGE UP (ref 22–31)
CREAT SERPL-MCNC: 1.1 MG/DL — SIGNIFICANT CHANGE UP (ref 0.5–1.3)
EGFR: 72 ML/MIN/1.73M2 — SIGNIFICANT CHANGE UP
GLUCOSE SERPL-MCNC: 171 MG/DL — HIGH (ref 70–99)
HCT VFR BLD CALC: 34.3 % — LOW (ref 39–50)
HGB BLD-MCNC: 11.8 G/DL — LOW (ref 13–17)
MCHC RBC-ENTMCNC: 30.1 PG — SIGNIFICANT CHANGE UP (ref 27–34)
MCHC RBC-ENTMCNC: 34.4 G/DL — SIGNIFICANT CHANGE UP (ref 32–36)
MCV RBC AUTO: 87.5 FL — SIGNIFICANT CHANGE UP (ref 80–100)
NRBC # BLD: 0 /100 WBCS — SIGNIFICANT CHANGE UP (ref 0–0)
PLATELET # BLD AUTO: 136 K/UL — LOW (ref 150–400)
POTASSIUM SERPL-MCNC: 3.9 MMOL/L — SIGNIFICANT CHANGE UP (ref 3.5–5.3)
POTASSIUM SERPL-SCNC: 3.9 MMOL/L — SIGNIFICANT CHANGE UP (ref 3.5–5.3)
RBC # BLD: 3.92 M/UL — LOW (ref 4.2–5.8)
RBC # FLD: 12.9 % — SIGNIFICANT CHANGE UP (ref 10.3–14.5)
SODIUM SERPL-SCNC: 138 MMOL/L — SIGNIFICANT CHANGE UP (ref 135–145)
WBC # BLD: 7.54 K/UL — SIGNIFICANT CHANGE UP (ref 3.8–10.5)
WBC # FLD AUTO: 7.54 K/UL — SIGNIFICANT CHANGE UP (ref 3.8–10.5)

## 2024-11-20 PROCEDURE — C9399: CPT

## 2024-11-20 PROCEDURE — C1776: CPT

## 2024-11-20 PROCEDURE — 36415 COLL VENOUS BLD VENIPUNCTURE: CPT

## 2024-11-20 PROCEDURE — 73030 X-RAY EXAM OF SHOULDER: CPT

## 2024-11-20 PROCEDURE — 97165 OT EVAL LOW COMPLEX 30 MIN: CPT

## 2024-11-20 PROCEDURE — 80048 BASIC METABOLIC PNL TOTAL CA: CPT

## 2024-11-20 PROCEDURE — 82962 GLUCOSE BLOOD TEST: CPT

## 2024-11-20 PROCEDURE — 93010 ELECTROCARDIOGRAM REPORT: CPT

## 2024-11-20 PROCEDURE — C1713: CPT

## 2024-11-20 PROCEDURE — 93005 ELECTROCARDIOGRAM TRACING: CPT

## 2024-11-20 PROCEDURE — 97161 PT EVAL LOW COMPLEX 20 MIN: CPT

## 2024-11-20 PROCEDURE — 85027 COMPLETE CBC AUTOMATED: CPT

## 2024-11-20 RX ORDER — ONDANSETRON HYDROCHLORIDE 4 MG/1
1 TABLET, FILM COATED ORAL
Qty: 20 | Refills: 0
Start: 2024-11-20 | End: 2024-11-24

## 2024-11-20 RX ORDER — NALOXONE HCL 0.4 MG/ML
1 AMPUL (ML) INJECTION
Qty: 4 | Refills: 0
Start: 2024-11-20 | End: 2024-11-20

## 2024-11-20 RX ORDER — OXYCODONE HYDROCHLORIDE 30 MG/1
1 TABLET ORAL
Qty: 20 | Refills: 0
Start: 2024-11-20 | End: 2024-11-24

## 2024-11-20 RX ORDER — DOCUSATE SODIUM 100 MG
1 CAPSULE ORAL
Qty: 20 | Refills: 0
Start: 2024-11-20 | End: 2024-11-24

## 2024-11-20 RX ADMIN — ISOSORBIDE DINITRATE 30 MILLIGRAM(S): 40 TABLET ORAL at 05:49

## 2024-11-20 RX ADMIN — Medication 100 MILLIGRAM(S): at 01:40

## 2024-11-20 RX ADMIN — CELECOXIB 200 MILLIGRAM(S): 200 CAPSULE ORAL at 07:03

## 2024-11-20 RX ADMIN — Medication 2: at 08:30

## 2024-11-20 RX ADMIN — TRAMADOL HYDROCHLORIDE 50 MILLIGRAM(S): 300 CAPSULE ORAL at 07:07

## 2024-11-20 RX ADMIN — CELECOXIB 200 MILLIGRAM(S): 200 CAPSULE ORAL at 05:49

## 2024-11-20 RX ADMIN — Medication 81 MILLIGRAM(S): at 11:38

## 2024-11-20 RX ADMIN — CARVEDILOL 3.12 MILLIGRAM(S): 25 TABLET, FILM COATED ORAL at 05:48

## 2024-11-20 RX ADMIN — Medication 3: at 11:51

## 2024-11-20 RX ADMIN — ACETAMINOPHEN 500MG 650 MILLIGRAM(S): 500 TABLET, COATED ORAL at 05:48

## 2024-11-20 RX ADMIN — ACETAMINOPHEN 500MG 650 MILLIGRAM(S): 500 TABLET, COATED ORAL at 07:03

## 2024-11-20 NOTE — CHART NOTE - NSCHARTNOTEFT_GEN_A_CORE
Pt remains asymptomatic since rapid response. Able to ambulate without initial AM lightheadedness. Pain controlled. BP stable at 90s/60s and afebrile. Pt would like to go home. Discussed w/ Dr. Bowens - pt cleared for DC.

## 2024-11-20 NOTE — DISCHARGE NOTE NURSING/CASE MANAGEMENT/SOCIAL WORK - PATIENT PORTAL LINK FT
You can access the FollowMyHealth Patient Portal offered by [facility] by registering at the following website: [email or web address]. By joining [facility]’s FollowMyHealth portal, you will also be able to view your health information using other applications (apps) compatible with our system.

## 2024-11-20 NOTE — PROGRESS NOTE ADULT - SUBJECTIVE AND OBJECTIVE BOX
Postop Check    Patient seen and examined at bedside S/p R rTSA with Dr. Bowens on 11/19. No acute complaints at this time. Pain well controlled. Denies chest pain, shortness of breath, nausea or vomiting.     PE:    Vital Signs Last 24 Hrs  T(C): 37.2 (11-20-24 @ 04:43), Max: 37.2 (11-20-24 @ 04:43)  T(F): 99 (11-20-24 @ 04:43), Max: 99 (11-20-24 @ 04:43)  HR: 99 (11-20-24 @ 04:43) (73 - 99)  BP: 127/81 (11-20-24 @ 04:43) (115/72 - 138/85)  BP(mean): --  RR: 20 (11-20-24 @ 04:43) (12 - 20)  SpO2: 95% (11-20-24 @ 04:43) (95% - 100%)      LABS:                          13.6   11.11 )-----------( 170      ( 19 Nov 2024 12:40 )             39.4     11-19    139  |  106  |  15  ----------------------------<  163[H]  4.4   |  25  |  1.00    Ca    8.7      19 Nov 2024 12:40              General: NAD, resting comfortably in bed  LUE:   Dressing C/D/I  Compartments soft and compressible  No calf tenderness bilaterally  +Axillary/Musculocutaneous/Radial/Median/AIN/PIN intact  SILT C5-T1  2+ radial pulses      A/P:  69y m s/p L rTSA POD 0  -PT/OT   -NWB on the LUE in shoulder immobilizer  -Pain Control  -DVT ppx: aspirin 81mg daily starting POD1  -Continue perioperative abx x 24 hours  -FU AM Labs  -Rest, ice, compress, and elevate the extremity as tolerated  -Incentive Spirometry  -Medical management appreciated  -Dispo per OT/PT; recs for home  -D/w Dr. Bowens, will update plan as needed

## 2024-11-20 NOTE — PROGRESS NOTE ADULT - SUBJECTIVE AND OBJECTIVE BOX
Patient resting comfortably in bed. Pain controlled. Sling in place. No over night events. Denies n/v/f/c/sob. Denies numbness or tingling.     ICU Vital Signs Last 24 Hrs  T(C): 37.2 (20 Nov 2024 04:43), Max: 37.2 (20 Nov 2024 04:43)  T(F): 99 (20 Nov 2024 04:43), Max: 99 (20 Nov 2024 04:43)  HR: 99 (20 Nov 2024 04:43) (73 - 99)  BP: 127/81 (20 Nov 2024 04:43) (115/72 - 138/85)  BP(mean): --  ABP: --  ABP(mean): --  RR: 20 (20 Nov 2024 04:43) (12 - 20)  SpO2: 95% (20 Nov 2024 04:43) (95% - 100%)    O2 Parameters below as of 19 Nov 2024 21:13  Patient On (Oxygen Delivery Method): room air      PE: LUE  NAD AAOx3  dressing c/d/i  sling in place  ain/pin/r/u/m intact  silt c5-t1  compartments soft, brisk CR  no calf ttp

## 2024-11-20 NOTE — OCCUPATIONAL THERAPY INITIAL EVALUATION ADULT - ADDITIONAL COMMENTS
Pt lives in a house with 5 stairs to enter with handrail and 13 stairs with handrail to access living area. Pt has a bathtub with shower curtain and grab bar. Pt is right hand dominant. Pt performed sit to stand and ambulated 30' with no devices and +left shd immobilizer at an independent level. Pt requires assistance with upper and lower body dressing and bathing due to NWB left UE. Pt and spouse educated on one-handed dressing techniques. Spouse reports that she will be available to assist patient with ADLs upon d/c home as needed.

## 2024-11-20 NOTE — PHARMACOTHERAPY INTERVENTION NOTE - COMMENTS
Age friendly prescribing (>65).  Rapid response called this morning for hypotensive episode.  Reviewed medications with Dr. Mojica and multiple medications could contribute to episode (also MD Mojica recommended 500mL NS bolus).  Decided to hold order of isosorbide dinitrate for now to avoid potential repeat event after next dose.

## 2024-11-20 NOTE — OCCUPATIONAL THERAPY INITIAL EVALUATION ADULT - GENERAL OBSERVATIONS, REHAB EVAL
Patient received seated in bedside chair, cooperative with occupational therapy with +left shoulder immobilizer in place and spouse present bedside. Patient chart reviewed, events noted. Patient cleared to be seen for therapy by RN prior to session.

## 2024-11-20 NOTE — OCCUPATIONAL THERAPY INITIAL EVALUATION ADULT - LEVEL OF INDEPENDENCE: DRESS UPPER BODY, OT EVAL
Bedside and Verbal shift change report given to RIKY Arriaga RN (oncoming nurse) by Shell Jimenez RN (offgoing nurse).  Report included the following information SBAR.     Spouse will be available to assist pt at home./maximum assist (25% patients effort)

## 2024-11-20 NOTE — DISCHARGE NOTE NURSING/CASE MANAGEMENT/SOCIAL WORK - FINANCIAL ASSISTANCE
[facility] provides services at a reduced cost to those who are determined to be eligible through [facility]’s financial assistance program. Information regarding [facility]’s financial assistance program can be found by going to [email or web address] or by calling [phone].

## 2024-11-20 NOTE — PROGRESS NOTE ADULT - ASSESSMENT
Patient is 69yM s/p left rTSA POD 1  -NWB LUE in sling  -ice prn  -pain control  -ASA 81 BID for dvt ppx  -PT eval  -plan for DC home today  -f/u in office in 14 days. Office will call tomorrow with further instructions and make an appt

## 2024-11-20 NOTE — OCCUPATIONAL THERAPY INITIAL EVALUATION ADULT - RANGE OF MOTION EXAMINATION, UPPER EXTREMITY
left UE: not assessed due to NWB status. Fine motor skills: WFL's/Right UE Active ROM was WFL (within functional limits)

## 2024-11-20 NOTE — PROVIDER CONTACT NOTE (CHANGE IN STATUS NOTIFICATION) - ASSESSMENT
Patient sat in chair and felt nauseous but deied dizziness. Fe minutes later patient loss focus and loss consciousness for less than a minute. Once pt reclined pt regained consciousness.

## 2024-11-20 NOTE — CHART NOTE - NSCHARTNOTEFT_GEN_A_CORE
Resident Rapid Response Note    Patient is a 69y old  Male who presents with a chief complaint of Left Shoulder Replacement (14 Nov 2024 13:22)      Rapid response was called at on this 69y Male patient for hypotension.    Patient was seen and examined at the bedside by the rapid response team and primary team. Dr. Mojica at bedside. Pt had syncope for less than one minute.     Rapid Response Vital Signs:  BP:   HR:   RR:  SpO2: % on   Temp:  FS:    Vital Signs Last 24 Hrs  T(C): 36.8 (20 Nov 2024 09:23), Max: 37.2 (20 Nov 2024 04:43)  T(F): 98.2 (20 Nov 2024 09:23), Max: 99 (20 Nov 2024 04:43)  HR: 85 (20 Nov 2024 09:23) (73 - 99)  BP: 96/55 (20 Nov 2024 09:23) (96/55 - 138/85)  BP(mean): --  RR: 18 (20 Nov 2024 09:23) (12 - 20)  SpO2: 94% (20 Nov 2024 09:23) (94% - 100%)    Parameters below as of 20 Nov 2024 09:23  Patient On (Oxygen Delivery Method): room air        Physical Exam:  General: Well developed, well nourished, in no acute distress  HEENT: NCAT, PERRLA, EOMI bl, moist mucous membranes   Neck: Supple, nontender, no mass  Neurology: A&Ox3, nonfocal, CN II-XII grossly intact, sensation intact, no gait abnormalities   Respiratory: CTA B/L, No wheezing, rales, or rhonchi  CV: RRR, S1/S2 present, no murmurs, rubs, or gallops  Abdominal: Soft, nontender, non-distended, normoactive bowel sounds  Extremities: No C/C/E, peripheral pulses present  MSK: Normal ROM, no joint erythema or warmth, no joint swelling   Skin: warm, dry, normal color, no obvious rash or abnormal lesions    LABS:                        11.8   7.54  )-----------( 136      ( 20 Nov 2024 06:51 )             34.3     20 Nov 2024 06:51    138    |  105    |  15     ----------------------------<  171    3.9     |  25     |  1.10     Ca    7.9        20 Nov 2024 06:51        Urinalysis Basic - ( 20 Nov 2024 06:51 )    Color: x / Appearance: x / SG: x / pH: x  Gluc: 171 mg/dL / Ketone: x  / Bili: x / Urobili: x   Blood: x / Protein: x / Nitrite: x   Leuk Esterase: x / RBC: x / WBC x   Sq Epi: x / Non Sq Epi: x / Bacteria: x      CAPILLARY BLOOD GLUCOSE      POCT Blood Glucose.: 221 mg/dL (20 Nov 2024 08:07)  POCT Blood Glucose.: 248 mg/dL (20 Nov 2024 07:36)  POCT Blood Glucose.: 176 mg/dL (19 Nov 2024 22:39)  POCT Blood Glucose.: 147 mg/dL (19 Nov 2024 16:40)  POCT Blood Glucose.: 156 mg/dL (19 Nov 2024 12:22)        RADIOLOGY & ADDITIONAL TESTS:      Assessment/Plan:  - IV fluids NS 0.5L  - EKG NSR paced  - Family and attending updated by Dr. Mojica  -Will continue to follow, RN to call if any changes. Resident Rapid Response Note    Patient is a 69y old  Male who presents with a chief complaint of Left Shoulder Replacement (14 Nov 2024 13:22)      Rapid response was called at on this 69y Male patient for hypotension.    Patient was seen and examined at the bedside by the rapid response team and primary team. Dr. Mojica at bedside. Pt had syncope for less than one minute.     Rapid Response Vital Signs:  BP: 78/55  HR: 89  RR: 18  SpO2: 98% on ra  Temp: 98.1  FS: 221    Vital Signs Last 24 Hrs  T(C): 36.8 (20 Nov 2024 09:23), Max: 37.2 (20 Nov 2024 04:43)  T(F): 98.2 (20 Nov 2024 09:23), Max: 99 (20 Nov 2024 04:43)  HR: 85 (20 Nov 2024 09:23) (73 - 99)  BP: 96/55 (20 Nov 2024 09:23) (96/55 - 138/85)  BP(mean): --  RR: 18 (20 Nov 2024 09:23) (12 - 20)  SpO2: 94% (20 Nov 2024 09:23) (94% - 100%)    Parameters below as of 20 Nov 2024 09:23  Patient On (Oxygen Delivery Method): room air        Physical Exam:  General: Well developed, well nourished, in no acute distress  HEENT: NCAT, PERRLA, EOMI bl, moist mucous membranes   Neck: Supple, nontender, no mass  Neurology: A&Ox3, nonfocal, CN II-XII grossly intact, sensation intact, no gait abnormalities   Respiratory: CTA B/L, No wheezing, rales, or rhonchi  CV: RRR, S1/S2 present, no murmurs, rubs, or gallops  Abdominal: Soft, nontender, non-distended, normoactive bowel sounds  Extremities: No C/C/E, peripheral pulses present  MSK: Normal ROM, no joint erythema or warmth, no joint swelling   Skin: warm, dry, normal color, no obvious rash or abnormal lesions    LABS:                        11.8   7.54  )-----------( 136      ( 20 Nov 2024 06:51 )             34.3     20 Nov 2024 06:51    138    |  105    |  15     ----------------------------<  171    3.9     |  25     |  1.10     Ca    7.9        20 Nov 2024 06:51        Urinalysis Basic - ( 20 Nov 2024 06:51 )    Color: x / Appearance: x / SG: x / pH: x  Gluc: 171 mg/dL / Ketone: x  / Bili: x / Urobili: x   Blood: x / Protein: x / Nitrite: x   Leuk Esterase: x / RBC: x / WBC x   Sq Epi: x / Non Sq Epi: x / Bacteria: x      CAPILLARY BLOOD GLUCOSE      POCT Blood Glucose.: 221 mg/dL (20 Nov 2024 08:07)  POCT Blood Glucose.: 248 mg/dL (20 Nov 2024 07:36)  POCT Blood Glucose.: 176 mg/dL (19 Nov 2024 22:39)  POCT Blood Glucose.: 147 mg/dL (19 Nov 2024 16:40)  POCT Blood Glucose.: 156 mg/dL (19 Nov 2024 12:22)        RADIOLOGY & ADDITIONAL TESTS:      Assessment/Plan:  - IV fluids NS 0.5L  - EKG NSR paced  - Family and attending updated by Dr. Mojica  -Will continue to follow, RN to call if any changes. Resident Rapid Response Note    Patient is a 69y old  Male who presents with a chief complaint of Left Shoulder Replacement (14 Nov 2024 13:22)      Rapid response was called at on this 69y Male patient for hypotension.    Patient was seen and examined at the bedside by the rapid response team and primary team. Dr. Mojica at bedside. Pt had syncope for less than one minute.     Rapid Response Vital Signs:  BP: 78/55  HR: 89  RR: 18  SpO2: 98% on ra  Temp: 98.1  FS: 221    Vital Signs Last 24 Hrs  T(C): 36.8 (20 Nov 2024 09:23), Max: 37.2 (20 Nov 2024 04:43)  T(F): 98.2 (20 Nov 2024 09:23), Max: 99 (20 Nov 2024 04:43)  HR: 85 (20 Nov 2024 09:23) (73 - 99)  BP: 96/55 (20 Nov 2024 09:23) (96/55 - 138/85)  BP(mean): --  RR: 18 (20 Nov 2024 09:23) (12 - 20)  SpO2: 94% (20 Nov 2024 09:23) (94% - 100%)    Parameters below as of 20 Nov 2024 09:23  Patient On (Oxygen Delivery Method): room air        Physical Exam:  General: Well developed, well nourished, in no acute distress  HEENT: NCAT, PERRLA, EOMI bl, moist mucous membranes   Neck: Supple, nontender, no mass  Neurology: A&Ox3, nonfocal, CN II-XII grossly intact, sensation intact, no gait abnormalities   Respiratory: CTA B/L, No wheezing, rales, or rhonchi  CV: RRR, S1/S2 present, no murmurs, rubs, or gallops  Abdominal: Soft, nontender, non-distended, normoactive bowel sounds  Extremities: No C/C/E, peripheral pulses present  MSK: Normal ROM, no joint erythema or warmth, no joint swelling   Skin: warm, dry, normal color, no obvious rash or abnormal lesions    LABS:                        11.8   7.54  )-----------( 136      ( 20 Nov 2024 06:51 )             34.3     20 Nov 2024 06:51    138    |  105    |  15     ----------------------------<  171    3.9     |  25     |  1.10     Ca    7.9        20 Nov 2024 06:51        Urinalysis Basic - ( 20 Nov 2024 06:51 )    Color: x / Appearance: x / SG: x / pH: x  Gluc: 171 mg/dL / Ketone: x  / Bili: x / Urobili: x   Blood: x / Protein: x / Nitrite: x   Leuk Esterase: x / RBC: x / WBC x   Sq Epi: x / Non Sq Epi: x / Bacteria: x      CAPILLARY BLOOD GLUCOSE      POCT Blood Glucose.: 221 mg/dL (20 Nov 2024 08:07)  POCT Blood Glucose.: 248 mg/dL (20 Nov 2024 07:36)  POCT Blood Glucose.: 176 mg/dL (19 Nov 2024 22:39)  POCT Blood Glucose.: 147 mg/dL (19 Nov 2024 16:40)  POCT Blood Glucose.: 156 mg/dL (19 Nov 2024 12:22)        RADIOLOGY & ADDITIONAL TESTS:      Assessment/Plan:  - IV fluids NS 0.5L  - EKG NSR paced  - Family and attending updated by Dr. Mojica  - Will continue to follow, RN to call if any changes.        Follow up:  Patient comfortable  Eating and drinking  S/p 500 ml bolus  Bp stable 110 systolic on manual

## 2024-11-20 NOTE — DISCHARGE NOTE NURSING/CASE MANAGEMENT/SOCIAL WORK - NSDCPNINST_GEN_ALL_CORE
Worsening pain not relieved WITH PAIN MEDS; DIZZINESS and passing out please call your doctor or call 911.  Numbness , tingling of the fingers and discoloration of fingers to go back to the emergency room

## 2024-11-21 ENCOUNTER — TRANSCRIPTION ENCOUNTER (OUTPATIENT)
Age: 69
End: 2024-11-21

## 2025-02-16 NOTE — ED ADULT TRIAGE NOTE - NS ED TRIAGE AVPU SCALE
Alert-The patient is alert, awake and responds to voice. The patient is oriented to time, place, and person. The triage nurse is able to obtain subjective information. Rain Fink MD attending physician 79-year-old woman comes in with a question of slurred speech and memory issues for the last week.  Previously she was in her apartment with her  who has ongoing Parkinson's and is now hospitalized probably awaiting placement.  In the meantime the patient has been noted to have increased memory issues or perhaps his on earth memory issues that were there.  Care is being provided at home by home care through [facility] with physician visits.  Last visit patient was supposed to be referred for cognitive testing and is going to be following up with neurologist.  Patient is very anxious in the house and focused on her cat as per her son.  She has her son with her in the emergency department there is a daughter that is also involved with her care.  There are no current aids in the house with the patient    Blood pressure 191/53 respiratory rate 16 heart rate 85 afebrile O2 sat 96    Pt alert and can phonate well  h at/nc  perrl, conj clear, sclera anicteric,  neck supple  abd soft no r/g/t  ext no edema no deformities  neueo awake, normal gait moves all extremities with strength  Simple memory questions patient says that her 's been out of the house for the last year and a half but in fact he has been gone for 2 weeks as per the son she has difficulty with any significant memory task  psych anxious  vs reasonable    Patient history and physical is consistent with dementia issues.  Patient is describing word finding problems and that she tried to  her phone and have a conversation but could not think of the words she wanted to say.  She has not had a CAT scan previously and it is very reasonable for us to scan her to look for additional issues as well as infection to see if that is worsening the situation.  I did an extensive conversation with the son at the bedside who fully appreciates what is going on with his mom and said that he will make sure she is in a safe environment tonight.  They are looking into long-term care options whether in home with aides or in a facility    I performed a history and physical exam of the patient and discussed their management with the resident and /or advanced care provider. I personally made/approved the management plan and take responsibility for the patient management. I reviewed the resident and /or ACP's note and agree with the documented findings and plan of care. My medical decison making and observations are found above.

## 2025-02-21 ENCOUNTER — APPOINTMENT (OUTPATIENT)
Dept: ELECTROPHYSIOLOGY | Facility: CLINIC | Age: 70
End: 2025-02-21
Payer: MEDICARE

## 2025-02-21 ENCOUNTER — NON-APPOINTMENT (OUTPATIENT)
Age: 70
End: 2025-02-21

## 2025-02-21 VITALS
TEMPERATURE: 98 F | SYSTOLIC BLOOD PRESSURE: 111 MMHG | HEIGHT: 70 IN | OXYGEN SATURATION: 96 % | DIASTOLIC BLOOD PRESSURE: 71 MMHG | BODY MASS INDEX: 24.05 KG/M2 | WEIGHT: 168 LBS | HEART RATE: 84 BPM

## 2025-02-21 DIAGNOSIS — E78.5 HYPERLIPIDEMIA, UNSPECIFIED: ICD-10-CM

## 2025-02-21 DIAGNOSIS — I50.9 HEART FAILURE, UNSPECIFIED: ICD-10-CM

## 2025-02-21 PROBLEM — M19.012 PRIMARY OSTEOARTHRITIS, LEFT SHOULDER: Chronic | Status: ACTIVE | Noted: 2024-11-14

## 2025-02-21 PROBLEM — Z86.79 PERSONAL HISTORY OF OTHER DISEASES OF THE CIRCULATORY SYSTEM: Chronic | Status: ACTIVE | Noted: 2024-11-14

## 2025-02-21 PROCEDURE — 99213 OFFICE O/P EST LOW 20 MIN: CPT | Mod: 25

## 2025-02-21 PROCEDURE — 93000 ELECTROCARDIOGRAM COMPLETE: CPT | Mod: 59

## 2025-02-21 PROCEDURE — 93283 PRGRMG EVAL IMPLANTABLE DFB: CPT

## 2025-05-23 ENCOUNTER — APPOINTMENT (OUTPATIENT)
Dept: ELECTROPHYSIOLOGY | Facility: CLINIC | Age: 70
End: 2025-05-23
Payer: MEDICARE

## 2025-05-23 ENCOUNTER — NON-APPOINTMENT (OUTPATIENT)
Age: 70
End: 2025-05-23

## 2025-05-23 VITALS
SYSTOLIC BLOOD PRESSURE: 108 MMHG | BODY MASS INDEX: 25.62 KG/M2 | HEART RATE: 75 BPM | OXYGEN SATURATION: 99 % | HEIGHT: 70 IN | DIASTOLIC BLOOD PRESSURE: 68 MMHG | WEIGHT: 179 LBS

## 2025-05-23 DIAGNOSIS — E78.5 HYPERLIPIDEMIA, UNSPECIFIED: ICD-10-CM

## 2025-05-23 DIAGNOSIS — I10 ESSENTIAL (PRIMARY) HYPERTENSION: ICD-10-CM

## 2025-05-23 PROCEDURE — 93000 ELECTROCARDIOGRAM COMPLETE: CPT

## 2025-05-23 PROCEDURE — 99214 OFFICE O/P EST MOD 30 MIN: CPT | Mod: 25

## 2025-05-23 RX ORDER — EMPAGLIFLOZIN 25 MG/1
25 TABLET, FILM COATED ORAL
Refills: 0 | Status: ACTIVE | COMMUNITY
Start: 2025-05-23

## 2025-05-29 NOTE — PATIENT PROFILE ADULT - PUBLIC BENEFITS
Number Of Freeze-Thaw Cycles: 3 freeze-thaw cycles Duration Of Freeze Thaw-Cycle (Seconds): 0 Post-Care Instructions: I reviewed with the patient in detail post-care instructions. Patient is to wear sunprotection, and avoid picking at any of the treated lesions. Pt may apply Vaseline to crusted or scabbing areas. Show Applicator Variable?: Yes Render Note In Bullet Format When Appropriate: No Consent: The patient's consent was obtained including but not limited to risks of crusting, scabbing, blistering, scarring, darker or lighter pigmentary change, recurrence, incomplete removal and infection. Detail Level: Simple no

## 2025-06-13 ENCOUNTER — APPOINTMENT (OUTPATIENT)
Dept: ELECTROPHYSIOLOGY | Facility: CLINIC | Age: 70
End: 2025-06-13
Payer: MEDICARE

## 2025-06-13 VITALS
OXYGEN SATURATION: 98 % | HEART RATE: 82 BPM | DIASTOLIC BLOOD PRESSURE: 64 MMHG | WEIGHT: 179 LBS | HEIGHT: 70 IN | SYSTOLIC BLOOD PRESSURE: 102 MMHG | BODY MASS INDEX: 25.62 KG/M2

## 2025-06-13 PROCEDURE — 93000 ELECTROCARDIOGRAM COMPLETE: CPT

## 2025-06-13 PROCEDURE — 99214 OFFICE O/P EST MOD 30 MIN: CPT | Mod: 25

## 2025-07-03 ENCOUNTER — OUTPATIENT (OUTPATIENT)
Dept: OUTPATIENT SERVICES | Facility: HOSPITAL | Age: 70
LOS: 1 days | End: 2025-07-03

## 2025-07-03 VITALS
SYSTOLIC BLOOD PRESSURE: 109 MMHG | HEIGHT: 71 IN | TEMPERATURE: 98 F | DIASTOLIC BLOOD PRESSURE: 70 MMHG | WEIGHT: 171.08 LBS | RESPIRATION RATE: 18 BRPM | HEART RATE: 66 BPM | OXYGEN SATURATION: 98 %

## 2025-07-03 DIAGNOSIS — I50.9 HEART FAILURE, UNSPECIFIED: ICD-10-CM

## 2025-07-03 DIAGNOSIS — Z95.810 PRESENCE OF AUTOMATIC (IMPLANTABLE) CARDIAC DEFIBRILLATOR: ICD-10-CM

## 2025-07-03 DIAGNOSIS — Z98.890 OTHER SPECIFIED POSTPROCEDURAL STATES: Chronic | ICD-10-CM

## 2025-07-03 DIAGNOSIS — Z95.5 PRESENCE OF CORONARY ANGIOPLASTY IMPLANT AND GRAFT: Chronic | ICD-10-CM

## 2025-07-03 DIAGNOSIS — Z95.810 PRESENCE OF AUTOMATIC (IMPLANTABLE) CARDIAC DEFIBRILLATOR: Chronic | ICD-10-CM

## 2025-07-03 DIAGNOSIS — E11.9 TYPE 2 DIABETES MELLITUS WITHOUT COMPLICATIONS: ICD-10-CM

## 2025-07-03 DIAGNOSIS — Z96.612 PRESENCE OF LEFT ARTIFICIAL SHOULDER JOINT: Chronic | ICD-10-CM

## 2025-07-03 LAB
A1C WITH ESTIMATED AVERAGE GLUCOSE RESULT: 7.2 % — HIGH (ref 4–5.6)
ANION GAP SERPL CALC-SCNC: 13 MMOL/L — SIGNIFICANT CHANGE UP (ref 7–14)
BASOPHILS # BLD AUTO: 0.03 K/UL — SIGNIFICANT CHANGE UP (ref 0–0.2)
BASOPHILS NFR BLD AUTO: 0.3 % — SIGNIFICANT CHANGE UP (ref 0–2)
BLD GP AB SCN SERPL QL: NEGATIVE — SIGNIFICANT CHANGE UP
BUN SERPL-MCNC: 14 MG/DL — SIGNIFICANT CHANGE UP (ref 7–23)
CALCIUM SERPL-MCNC: 9.3 MG/DL — SIGNIFICANT CHANGE UP (ref 8.4–10.5)
CHLORIDE SERPL-SCNC: 101 MMOL/L — SIGNIFICANT CHANGE UP (ref 98–107)
CO2 SERPL-SCNC: 22 MMOL/L — SIGNIFICANT CHANGE UP (ref 22–31)
CREAT SERPL-MCNC: 0.91 MG/DL — SIGNIFICANT CHANGE UP (ref 0.5–1.3)
EGFR: 91 ML/MIN/1.73M2 — SIGNIFICANT CHANGE UP
EGFR: 91 ML/MIN/1.73M2 — SIGNIFICANT CHANGE UP
EOSINOPHIL # BLD AUTO: 0.05 K/UL — SIGNIFICANT CHANGE UP (ref 0–0.5)
EOSINOPHIL NFR BLD AUTO: 0.5 % — SIGNIFICANT CHANGE UP (ref 0–6)
ESTIMATED AVERAGE GLUCOSE: 160 — SIGNIFICANT CHANGE UP
GLUCOSE SERPL-MCNC: 118 MG/DL — HIGH (ref 70–99)
HCT VFR BLD CALC: 39.1 % — SIGNIFICANT CHANGE UP (ref 39–50)
HGB BLD-MCNC: 13 G/DL — SIGNIFICANT CHANGE UP (ref 13–17)
IMM GRANULOCYTES NFR BLD AUTO: 0.3 % — SIGNIFICANT CHANGE UP (ref 0–0.9)
LYMPHOCYTES # BLD AUTO: 2.38 K/UL — SIGNIFICANT CHANGE UP (ref 1–3.3)
LYMPHOCYTES # BLD AUTO: 25.9 % — SIGNIFICANT CHANGE UP (ref 13–44)
MCHC RBC-ENTMCNC: 28.3 PG — SIGNIFICANT CHANGE UP (ref 27–34)
MCHC RBC-ENTMCNC: 33.2 G/DL — SIGNIFICANT CHANGE UP (ref 32–36)
MCV RBC AUTO: 85 FL — SIGNIFICANT CHANGE UP (ref 80–100)
MONOCYTES # BLD AUTO: 0.43 K/UL — SIGNIFICANT CHANGE UP (ref 0–0.9)
MONOCYTES NFR BLD AUTO: 4.7 % — SIGNIFICANT CHANGE UP (ref 2–14)
NEUTROPHILS # BLD AUTO: 6.28 K/UL — SIGNIFICANT CHANGE UP (ref 1.8–7.4)
NEUTROPHILS NFR BLD AUTO: 68.3 % — SIGNIFICANT CHANGE UP (ref 43–77)
PLATELET # BLD AUTO: 182 K/UL — SIGNIFICANT CHANGE UP (ref 150–400)
POTASSIUM SERPL-MCNC: 4 MMOL/L — SIGNIFICANT CHANGE UP (ref 3.5–5.3)
POTASSIUM SERPL-SCNC: 4 MMOL/L — SIGNIFICANT CHANGE UP (ref 3.5–5.3)
RBC # BLD: 4.6 M/UL — SIGNIFICANT CHANGE UP (ref 4.2–5.8)
RBC # FLD: 14.6 % — HIGH (ref 10.3–14.5)
RH IG SCN BLD-IMP: POSITIVE — SIGNIFICANT CHANGE UP
RH IG SCN BLD-IMP: POSITIVE — SIGNIFICANT CHANGE UP
SODIUM SERPL-SCNC: 136 MMOL/L — SIGNIFICANT CHANGE UP (ref 135–145)
WBC # BLD: 9.2 K/UL — SIGNIFICANT CHANGE UP (ref 3.8–10.5)
WBC # FLD AUTO: 9.2 K/UL — SIGNIFICANT CHANGE UP (ref 3.8–10.5)

## 2025-07-03 RX ORDER — SITAGLIPTIN 100 MG/1
100 TABLET, FILM COATED ORAL
Refills: 0 | Status: ACTIVE | COMMUNITY

## 2025-07-03 NOTE — H&P PST ADULT - NSICDXPASTSURGICALHX_GEN_ALL_CORE_FT
PAST SURGICAL HISTORY:  H/O colonoscopy 14 years ago    History of arthroplasty of left shoulder     History of automatic internal cardiac defibrillator (AICD) Medtronic [id number] - placed 2007, generator change 2014 - per pt, interrogtaed within last 6 months    Stented coronary artery x 2 - 12/2006

## 2025-07-03 NOTE — H&P PST ADULT - CARDIOVASCULAR COMMENTS
AICD - internal to left upper anterior chest wall CAD, MI, PCI stentX2 - 12/27/2006, hypertension, hyperlipidemia, cardiomyopathy with an EF of 35%, SVT, s/p Medtronic ICD placement in 2007 Gen change in 2014.

## 2025-07-03 NOTE — H&P PST ADULT - PROBLEM SELECTOR PLAN 1
scheduled for dual chamber ICD gen change upgrade to BIV-ICD MDT  Written & verbal preop instructions, & surgical soap given  Pt verbalized good understanding.  Teach back done on surgical soap instructions. scheduled for dual chamber ICD gen change upgrade to BIV-ICD MDT  As per EP instruction ok to take all cardiac medications including anticoagulants - carvedilol, Isosorbide, aspirin   Written & verbal preop instructions, & surgical soap given  Pt verbalized good understanding.  Teach back done on surgical soap instructions.

## 2025-07-03 NOTE — H&P PST ADULT - PROBLEM SELECTOR PLAN 2
Fs on admit  Pt  instructed last dose metformin and Januvia last dose 7/6/25  Pt states he is not on Jardiance.  Instructed to contact Dr Dominguez office and discuss.

## 2025-07-03 NOTE — H&P PST ADULT - NEGATIVE CARDIOVASCULAR SYMPTOMS
Applied
no chest pain/no palpitations/no dyspnea on exertion/no orthopnea/no paroxysmal nocturnal dyspnea/no peripheral edema/no claudication

## 2025-07-03 NOTE — H&P PST ADULT - LAST ECHOCARDIOGRAM
2024 as per dr Dominguez note (allscript)  2022 showed patient had an EF of 40%, pt report  had echo 2024 with Dr Schultz (cardiologist)

## 2025-07-03 NOTE — H&P PST ADULT - HISTORY OF PRESENT ILLNESS
69-year-old male with pmhx  CAD, MI, PCI stentX2 - 12/27/2006, hypertension, hyperlipidemia, cardiomyopathy with an EF of 35%, SVT, s/p Medtronic ICD placement in 2007 Gen change in 2014.  Pt presents for preop eval for scheduled dual chamber ICD gen change upgrade to BIV-ICD MDT.  Pt reported he underwent a stress test and echo this year which were normal.  Denies palpitations, chest pain, shortness of breath, dizziness, syncope, or near syncope  As per Dr Dominguez note:  His device interrogation at last visit showed no sustained arrhythmic events brief NSVT. 2 months to [facility]. . Echocardiogram report from 2022 showed patient had an EF of 40%

## 2025-07-03 NOTE — H&P PST ADULT - MS GEN HX ROS MEA POS PC
right shoulder/arthralgia/arthritis/joint pain right shoulder, h/o left shoulder arthroplasty/arthralgia/arthritis/joint pain

## 2025-07-07 ENCOUNTER — NON-APPOINTMENT (OUTPATIENT)
Age: 70
End: 2025-07-07

## 2025-07-07 ENCOUNTER — OUTPATIENT (OUTPATIENT)
Dept: OUTPATIENT SERVICES | Facility: HOSPITAL | Age: 70
LOS: 1 days | End: 2025-07-07
Payer: MEDICARE

## 2025-07-07 ENCOUNTER — RESULT REVIEW (OUTPATIENT)
Age: 70
End: 2025-07-07

## 2025-07-07 ENCOUNTER — TRANSCRIPTION ENCOUNTER (OUTPATIENT)
Age: 70
End: 2025-07-07

## 2025-07-07 VITALS
DIASTOLIC BLOOD PRESSURE: 77 MMHG | HEART RATE: 78 BPM | TEMPERATURE: 98 F | SYSTOLIC BLOOD PRESSURE: 116 MMHG | OXYGEN SATURATION: 98 % | HEIGHT: 71 IN | RESPIRATION RATE: 18 BRPM | WEIGHT: 171.96 LBS

## 2025-07-07 VITALS
RESPIRATION RATE: 15 BRPM | DIASTOLIC BLOOD PRESSURE: 67 MMHG | OXYGEN SATURATION: 99 % | HEART RATE: 75 BPM | SYSTOLIC BLOOD PRESSURE: 103 MMHG

## 2025-07-07 DIAGNOSIS — Z98.890 OTHER SPECIFIED POSTPROCEDURAL STATES: Chronic | ICD-10-CM

## 2025-07-07 DIAGNOSIS — I50.9 HEART FAILURE, UNSPECIFIED: ICD-10-CM

## 2025-07-07 DIAGNOSIS — Z96.612 PRESENCE OF LEFT ARTIFICIAL SHOULDER JOINT: Chronic | ICD-10-CM

## 2025-07-07 DIAGNOSIS — Z95.810 PRESENCE OF AUTOMATIC (IMPLANTABLE) CARDIAC DEFIBRILLATOR: Chronic | ICD-10-CM

## 2025-07-07 DIAGNOSIS — Z95.5 PRESENCE OF CORONARY ANGIOPLASTY IMPLANT AND GRAFT: Chronic | ICD-10-CM

## 2025-07-07 LAB — GLUCOSE BLDC GLUCOMTR-MCNC: 128 MG/DL — HIGH (ref 70–99)

## 2025-07-07 PROCEDURE — 33225 L VENTRIC PACING LEAD ADD-ON: CPT

## 2025-07-07 PROCEDURE — 71045 X-RAY EXAM CHEST 1 VIEW: CPT | Mod: 26

## 2025-07-07 PROCEDURE — 33264 RMVL & RPLCMT DFB GEN MLT LD: CPT

## 2025-07-07 PROCEDURE — 93010 ELECTROCARDIOGRAM REPORT: CPT | Mod: 76

## 2025-07-07 RX ORDER — ASPIRIN 325 MG
1 TABLET ORAL
Refills: 0 | DISCHARGE

## 2025-07-07 RX ORDER — ISOSORBIDE MONONITRATE 60 MG/1
1 TABLET, EXTENDED RELEASE ORAL
Refills: 0 | DISCHARGE

## 2025-07-07 NOTE — ASU PATIENT PROFILE, ADULT - FALL HARM RISK - UNIVERSAL INTERVENTIONS
Bed in lowest position, wheels locked, appropriate side rails in place/Call bell, personal items and telephone in reach/Instruct patient to call for assistance before getting out of bed or chair/Non-slip footwear when patient is out of bed/[place] to call system/Physically safe environment - no spills, clutter or unnecessary equipment/Purposeful Proactive Rounding/Room/bathroom lighting operational, light cord in reach

## 2025-07-07 NOTE — ASU DISCHARGE PLAN (ADULT/PEDIATRIC) - ASU DC SPECIAL INSTRUCTIONSFT
Discharge instructions reviewed with patient and written instructions given  Patient states he understands he has to remove outer dressing 24 hours post, after which can shower with only water at the site  Was told not to rub, apply lotion, soap or cream to site  Activity restrictions reviewed including not lifting left arm over shoulder and lifting >10 lbs until cleared at his follow up appointment   Patient will call office [phone] if he has any questions/concerns or experiences fever, chills, or redness/swelling/increased pain at the incision site   Patient directed to the closest ER if experiencing any severe symptoms including chest pain, SOB, lightheadedness, dizziness, syncope  Patient understands no MRI x 6 weeks after procedure   Follow up appointment date and time given to patient 7/22 at 940AM  [phone] [place] [address]   [facility] [address] [place] [address]   [phone]

## 2025-07-07 NOTE — ASU PATIENT PROFILE, ADULT - BRADEN SCORE (IF 18 OR LESS ACTIVATE SKIN INJURY RISK INCREASED GUIDELINE), MLM
Apixaban/Eliquis increases your risk for bleeding. Notify your doctor if you experience any of the following side effects: bleeding, coughing or vomiting blood, red or black stool, unexpected pain or swelling, itching or hives, chest pain, chest tightness, trouble breathing, changes in how much or how often you urinate, red or pink urine, numbness or tingling in your feet, or unusual muscle weakness. When Apixaban/Eliquis is taken with other medicines, they can affect how it works. Taking other medications such as aspirin, blood thinners, nonsteroidal anti-inflammatories, and medications that treat depression can increase your risk of bleeding. It is very important to tell your health care provider about all of the other medicines, including over-the-counter medications, herbs, and vitamins you are taking. DO NOT start, stop, or change the dosage of any medicine, including over-the-counter medicines, vitamins, and herbal products without your doctor’s approval. Any products containing aspirin or are nonsteroidal anti-inflammatories lessen the blood’s ability to form clots and add to the effect of Apixaban/Eliquis. Never take aspirin or medicines that contain aspirin without speaking to your doctor. 22

## 2025-07-22 ENCOUNTER — NON-APPOINTMENT (OUTPATIENT)
Age: 70
End: 2025-07-22

## 2025-07-22 ENCOUNTER — APPOINTMENT (OUTPATIENT)
Dept: ELECTROPHYSIOLOGY | Facility: CLINIC | Age: 70
End: 2025-07-22
Payer: MEDICARE

## 2025-07-22 DIAGNOSIS — I42.9 CARDIOMYOPATHY, UNSPECIFIED: ICD-10-CM

## 2025-07-22 PROCEDURE — 99024 POSTOP FOLLOW-UP VISIT: CPT

## 2025-09-16 ENCOUNTER — NON-APPOINTMENT (OUTPATIENT)
Age: 70
End: 2025-09-16

## 2025-09-16 ENCOUNTER — APPOINTMENT (OUTPATIENT)
Dept: ELECTROPHYSIOLOGY | Facility: CLINIC | Age: 70
End: 2025-09-16
Payer: MEDICARE

## 2025-09-16 VITALS — SYSTOLIC BLOOD PRESSURE: 100 MMHG | DIASTOLIC BLOOD PRESSURE: 64 MMHG | HEART RATE: 85 BPM

## 2025-09-16 PROCEDURE — 93284 PRGRMG EVAL IMPLANTABLE DFB: CPT

## (undated) DEVICE — SOL IRR BAG NS 0.9% 3000ML

## (undated) DEVICE — SLING SHOULDER IMMOBILIZER CLINIC MEDIUM

## (undated) DEVICE — PLV/PSP-ESU VALLEYLAB T7E14761DX: Type: DURABLE MEDICAL EQUIPMENT

## (undated) DEVICE — DRAPE SPLIT SHEET 77" X 108"

## (undated) DEVICE — SAW BLADE STRYKER SAGITTAL DUAL CUT 25X90X1.27MM

## (undated) DEVICE — DRAPE SHOULDER

## (undated) DEVICE — SOL IRR POUR H2O 1000ML

## (undated) DEVICE — GLV 7 PROTEXIS (WHITE)

## (undated) DEVICE — CLAMP BX HOT RAD JAW 3

## (undated) DEVICE — SYR LUER LOK 50CC

## (undated) DEVICE — PACK IV START WITH CHG

## (undated) DEVICE — BIOPSY FORCEP RADIAL JAW 4 STANDARD WITH NEEDLE

## (undated) DEVICE — DRAPE INSTRUMENT POUCH 6.75" X 11"

## (undated) DEVICE — LAP PAD W RING 18 X 18"

## (undated) DEVICE — SOL IRR POUR NS 0.9% 500ML

## (undated) DEVICE — DRSG STOCKINETTE IMPERVIOUS LG 9 X 48"

## (undated) DEVICE — TAPE SILK 2"

## (undated) DEVICE — POSITIONER FOAM EGG CRATE ULNAR 2PCS (PINK)

## (undated) DEVICE — LONE STAR ELASTIC STAY HOOK 5MM SHARP

## (undated) DEVICE — NDL HYPO SAFE 22G X 1.5" (BLACK)

## (undated) DEVICE — DRSG DERMABOND 0.7ML

## (undated) DEVICE — POLY TRAP ETRAP

## (undated) DEVICE — DRAPE IOBAN 23" X 23"

## (undated) DEVICE — ARTHREX GLENOID CALIBRATOR

## (undated) DEVICE — DRAPE 1/2 SHEET 40X57"

## (undated) DEVICE — PREP BETADINE 5% STERILE OPTHALMIC SOLUTION

## (undated) DEVICE — SUT POLYSORB 0 30" GS-21 UNDYED

## (undated) DEVICE — FORCEP RADIAL JAW 4 JUMBO 2.8MM 3.2MM 240CM ORANGE DISP

## (undated) DEVICE — LAP PAD 18 X 18"

## (undated) DEVICE — SUT POLYSORB 2-0 30" C-14 UNDYED

## (undated) DEVICE — TUBING SUCTION 20FT

## (undated) DEVICE — DRAPE TOWEL BLUE 17" X 24"

## (undated) DEVICE — LONE STAR RETRACTOR SQUARE 14.1CMX14.1CM DISP

## (undated) DEVICE — DRSG COBAN 4"

## (undated) DEVICE — LIGASURE SMALL JAW

## (undated) DEVICE — DRSG CURITY GAUZE SPONGE 4 X 4" 12-PLY

## (undated) DEVICE — MEDICATION LABELS W MARKER

## (undated) DEVICE — FOLEY HOLDER STATLOCK 2 WAY ADULT

## (undated) DEVICE — TUBING SUCTION CONN 6FT STERILE

## (undated) DEVICE — FOLEY TRAY 16FR 5CC LTX UMETER CLOSED

## (undated) DEVICE — DRAPE 3/4 SHEET W REINFORCEMENT 56X77"

## (undated) DEVICE — BLADE SCALPEL SAFETYLOCK #15

## (undated) DEVICE — DRSG MCCONNELL ARM WRAP LG

## (undated) DEVICE — HOOD FLYTE STRYKER HELMET SHIELD

## (undated) DEVICE — GLV 7.5 PROTEXIS (BLUE)

## (undated) DEVICE — DRAPE MAYO STAND 30"

## (undated) DEVICE — ELCTR BOVIE TIP BLADE INSULATED 2.75" EDGE

## (undated) DEVICE — PLV-SCD MACHINE: Type: DURABLE MEDICAL EQUIPMENT

## (undated) DEVICE — SENSOR O2 FINGER ADULT

## (undated) DEVICE — SLING SHOULDER IMMOBILIZER QUICK-FIT UNIVERSAL

## (undated) DEVICE — VISITEC 4X4

## (undated) DEVICE — ELCTR BOVIE PENCIL SMOKE EVACUATION

## (undated) DEVICE — POSITIONER HEAD REST PRONE

## (undated) DEVICE — SUCTION YANKAUER NO CONTROL VENT

## (undated) DEVICE — CATH IV SAFE BC 20G X 1.16" (PINK)

## (undated) DEVICE — PLV-STRYKER SYSTEM 8: Type: DURABLE MEDICAL EQUIPMENT

## (undated) DEVICE — DRSG TEGADERM 4 X 4.75"

## (undated) DEVICE — SOL IRR POUR NS 0.9% 1000ML

## (undated) DEVICE — GLV 8.5 PROTEXIS (WHITE)

## (undated) DEVICE — GLV 8 PROTEXIS (WHITE)

## (undated) DEVICE — POSITIONER CUSHION INSERT PRONE VIEW LG

## (undated) DEVICE — CATH IV SAFE BC 22G X 1" (BLUE)

## (undated) DEVICE — BRUSH COLONOSCOPY CYTOLOGY

## (undated) DEVICE — PACK GENERAL MINOR

## (undated) DEVICE — HOOD T5 PEELAWAY

## (undated) DEVICE — GOWN TRIMAX LG

## (undated) DEVICE — REAMER ANGL HEAD SM

## (undated) DEVICE — SPECIMEN CONTAINER 100ML

## (undated) DEVICE — STRYKER INTERPULSE HANDPIECE W IRR SUCTION TUBE

## (undated) DEVICE — SUT FIBERWIRE #2 38" STRAND 1 BLUE T-5 TAPER

## (undated) DEVICE — VENODYNE/SCD SLEEVE CALF LARGE

## (undated) DEVICE — SLING SHOULDER IMMOBILIZER CLINIC LARGE

## (undated) DEVICE — VENODYNE/SCD SLEEVE CALF MEDIUM

## (undated) DEVICE — SOL IRR POUR H2O 250ML

## (undated) DEVICE — GLV 7.5 PROTEXIS (WHITE)

## (undated) DEVICE — WARMING BLANKET UPPER ADULT

## (undated) DEVICE — SOL INJ NS 0.9% 500ML 2 PORT

## (undated) DEVICE — SUT MONOCRYL 3-0 27" PS-2 UNDYED

## (undated) DEVICE — GLV 6.5 PROTEXIS (WHITE)

## (undated) DEVICE — SYR LUER LOK 10CC

## (undated) DEVICE — ELCTR GROUNDING PAD ADULT COVIDIEN

## (undated) DEVICE — BLADE SCALPEL SAFETYLOCK #10

## (undated) DEVICE — TUBING IV SET GRAVITY 3Y 100" MACRO

## (undated) DEVICE — WARMING BLANKET LOWER ADULT

## (undated) DEVICE — FLOORMAT SURGISAFE ABSORBANT WHITE 36" X 72"

## (undated) DEVICE — PREP BETADINE KIT

## (undated) DEVICE — DRILL BIT ARTHREX 3MM

## (undated) DEVICE — SYR ASEPTO

## (undated) DEVICE — IRRIGATOR BIO SHIELD